# Patient Record
Sex: FEMALE | Race: WHITE | NOT HISPANIC OR LATINO | URBAN - METROPOLITAN AREA
[De-identification: names, ages, dates, MRNs, and addresses within clinical notes are randomized per-mention and may not be internally consistent; named-entity substitution may affect disease eponyms.]

---

## 2025-08-08 ENCOUNTER — HOSPITAL ENCOUNTER (INPATIENT)
Facility: HOSPITAL | Age: 60
End: 2025-08-08
Attending: EMERGENCY MEDICINE | Admitting: STUDENT IN AN ORGANIZED HEALTH CARE EDUCATION/TRAINING PROGRAM
Payer: COMMERCIAL

## 2025-08-08 ENCOUNTER — APPOINTMENT (OUTPATIENT)
Dept: RADIOLOGY | Facility: HOSPITAL | Age: 60
DRG: 638 | End: 2025-08-08
Payer: COMMERCIAL

## 2025-08-08 DIAGNOSIS — R73.9 HYPERGLYCEMIA: Primary | ICD-10-CM

## 2025-08-08 DIAGNOSIS — E11.10 DIABETIC KETOACIDOSIS WITHOUT COMA ASSOCIATED WITH TYPE 2 DIABETES MELLITUS: ICD-10-CM

## 2025-08-08 LAB
ALBUMIN SERPL BCP-MCNC: 3.9 G/DL (ref 3.4–5)
ALP SERPL-CCNC: 82 U/L (ref 33–110)
ALT SERPL W P-5'-P-CCNC: 13 U/L (ref 7–45)
AMPHETAMINES UR QL SCN: NORMAL
ANION GAP BLDV CALCULATED.4IONS-SCNC: 10 MMOL/L (ref 10–25)
ANION GAP SERPL CALC-SCNC: 18 MMOL/L (ref 10–20)
APPEARANCE UR: CLEAR
AST SERPL W P-5'-P-CCNC: 13 U/L (ref 9–39)
B-OH-BUTYR SERPL-SCNC: 1.7 MMOL/L (ref 0.02–0.27)
BARBITURATES UR QL SCN: NORMAL
BASE EXCESS BLDV CALC-SCNC: -2.5 MMOL/L (ref -2–3)
BASOPHILS # BLD AUTO: 0.03 X10*3/UL (ref 0–0.1)
BASOPHILS NFR BLD AUTO: 0.4 %
BENZODIAZ UR QL SCN: NORMAL
BILIRUB SERPL-MCNC: 0.5 MG/DL (ref 0–1.2)
BILIRUB UR STRIP.AUTO-MCNC: NEGATIVE MG/DL
BODY TEMPERATURE: ABNORMAL
BUN SERPL-MCNC: 15 MG/DL (ref 6–23)
BZE UR QL SCN: NORMAL
CA-I BLDV-SCNC: 1.19 MMOL/L (ref 1.1–1.33)
CALCIUM SERPL-MCNC: 9.1 MG/DL (ref 8.6–10.3)
CANNABINOIDS UR QL SCN: NORMAL
CHLORIDE BLDV-SCNC: 97 MMOL/L (ref 98–107)
CHLORIDE SERPL-SCNC: 94 MMOL/L (ref 98–107)
CO2 SERPL-SCNC: 19 MMOL/L (ref 21–32)
COLOR UR: COLORLESS
CREAT SERPL-MCNC: 0.97 MG/DL (ref 0.5–1.05)
CRITICAL CALL TIME: 1503
CRITICAL CALLED BY: ABNORMAL
CRITICAL CALLED TO: ABNORMAL
CRITICAL READ BACK: ABNORMAL
EGFRCR SERPLBLD CKD-EPI 2021: 68 ML/MIN/1.73M*2
EOSINOPHIL # BLD AUTO: 0.08 X10*3/UL (ref 0–0.7)
EOSINOPHIL NFR BLD AUTO: 0.9 %
ERYTHROCYTE [DISTWIDTH] IN BLOOD BY AUTOMATED COUNT: 12.2 % (ref 11.5–14.5)
FENTANYL+NORFENTANYL UR QL SCN: NORMAL
GLUCOSE BLD MANUAL STRIP-MCNC: 330 MG/DL (ref 74–99)
GLUCOSE BLD MANUAL STRIP-MCNC: 401 MG/DL (ref 74–99)
GLUCOSE BLD MANUAL STRIP-MCNC: 448 MG/DL (ref 74–99)
GLUCOSE BLD MANUAL STRIP-MCNC: >600 MG/DL (ref 74–99)
GLUCOSE BLD MANUAL STRIP-MCNC: >600 MG/DL (ref 74–99)
GLUCOSE BLDV-MCNC: >685 MG/DL (ref 74–99)
GLUCOSE SERPL-MCNC: 715 MG/DL (ref 74–99)
GLUCOSE UR STRIP.AUTO-MCNC: ABNORMAL MG/DL
HCO3 BLDV-SCNC: 21.7 MMOL/L (ref 22–26)
HCT VFR BLD AUTO: 39.6 % (ref 36–46)
HCT VFR BLD EST: 42 % (ref 36–46)
HGB BLD-MCNC: 13.8 G/DL (ref 12–16)
HGB BLDV-MCNC: 14 G/DL (ref 12–16)
IMM GRANULOCYTES # BLD AUTO: 0.03 X10*3/UL (ref 0–0.7)
IMM GRANULOCYTES NFR BLD AUTO: 0.4 % (ref 0–0.9)
INHALED O2 CONCENTRATION: 21 %
KETONES UR STRIP.AUTO-MCNC: ABNORMAL MG/DL
LACTATE BLDV-SCNC: 2.1 MMOL/L (ref 0.4–2)
LACTATE BLDV-SCNC: 2.4 MMOL/L (ref 0.4–2)
LACTATE SERPL-SCNC: 1 MMOL/L (ref 0.4–2)
LACTATE SERPL-SCNC: 2.1 MMOL/L (ref 0.4–2)
LACTATE SERPL-SCNC: 2.2 MMOL/L (ref 0.4–2)
LEUKOCYTE ESTERASE UR QL STRIP.AUTO: ABNORMAL
LYMPHOCYTES # BLD AUTO: 1.41 X10*3/UL (ref 1.2–4.8)
LYMPHOCYTES NFR BLD AUTO: 16.5 %
MCH RBC QN AUTO: 30.1 PG (ref 26–34)
MCHC RBC AUTO-ENTMCNC: 34.8 G/DL (ref 32–36)
MCV RBC AUTO: 87 FL (ref 80–100)
METHADONE UR QL SCN: NORMAL
MONOCYTES # BLD AUTO: 0.68 X10*3/UL (ref 0.1–1)
MONOCYTES NFR BLD AUTO: 8 %
MUCOUS THREADS #/AREA URNS AUTO: ABNORMAL /LPF
NEUTROPHILS # BLD AUTO: 6.3 X10*3/UL (ref 1.2–7.7)
NEUTROPHILS NFR BLD AUTO: 73.8 %
NITRITE UR QL STRIP.AUTO: NEGATIVE
NRBC BLD-RTO: 0 /100 WBCS (ref 0–0)
OPIATES UR QL SCN: NORMAL
OXYCODONE+OXYMORPHONE UR QL SCN: NORMAL
OXYHGB MFR BLDV: 77.3 % (ref 45–75)
PCO2 BLDV: 35 MM HG (ref 41–51)
PCP UR QL SCN: NORMAL
PH BLDV: 7.4 PH (ref 7.33–7.43)
PH UR STRIP.AUTO: 5 [PH]
PLATELET # BLD AUTO: 214 X10*3/UL (ref 150–450)
PO2 BLDV: 46 MM HG (ref 35–45)
POTASSIUM BLDV-SCNC: 4.1 MMOL/L (ref 3.5–5.3)
POTASSIUM SERPL-SCNC: 3.9 MMOL/L (ref 3.5–5.3)
PROT SERPL-MCNC: 7 G/DL (ref 6.4–8.2)
PROT UR STRIP.AUTO-MCNC: NEGATIVE MG/DL
RBC # BLD AUTO: 4.58 X10*6/UL (ref 4–5.2)
RBC # UR STRIP.AUTO: NEGATIVE MG/DL
RBC #/AREA URNS AUTO: ABNORMAL /HPF
SAO2 % BLDV: 83 % (ref 45–75)
SODIUM BLDV-SCNC: 125 MMOL/L (ref 136–145)
SODIUM SERPL-SCNC: 127 MMOL/L (ref 136–145)
SP GR UR STRIP.AUTO: <1.005
SQUAMOUS #/AREA URNS AUTO: ABNORMAL /HPF
UROBILINOGEN UR STRIP.AUTO-MCNC: ABNORMAL MG/DL
WBC # BLD AUTO: 8.5 X10*3/UL (ref 4.4–11.3)
WBC #/AREA URNS AUTO: ABNORMAL /HPF

## 2025-08-08 PROCEDURE — 81001 URINALYSIS AUTO W/SCOPE: CPT | Performed by: EMERGENCY MEDICINE

## 2025-08-08 PROCEDURE — 83605 ASSAY OF LACTIC ACID: CPT | Performed by: EMERGENCY MEDICINE

## 2025-08-08 PROCEDURE — 2500000004 HC RX 250 GENERAL PHARMACY W/ HCPCS (ALT 636 FOR OP/ED)

## 2025-08-08 PROCEDURE — 82947 ASSAY GLUCOSE BLOOD QUANT: CPT

## 2025-08-08 PROCEDURE — 82565 ASSAY OF CREATININE: CPT | Performed by: EMERGENCY MEDICINE

## 2025-08-08 PROCEDURE — 36415 COLL VENOUS BLD VENIPUNCTURE: CPT | Performed by: EMERGENCY MEDICINE

## 2025-08-08 PROCEDURE — 36415 COLL VENOUS BLD VENIPUNCTURE: CPT

## 2025-08-08 PROCEDURE — 83605 ASSAY OF LACTIC ACID: CPT

## 2025-08-08 PROCEDURE — 2500000002 HC RX 250 W HCPCS SELF ADMINISTERED DRUGS (ALT 637 FOR MEDICARE OP, ALT 636 FOR OP/ED): Performed by: EMERGENCY MEDICINE

## 2025-08-08 PROCEDURE — 85025 COMPLETE CBC W/AUTO DIFF WBC: CPT | Performed by: EMERGENCY MEDICINE

## 2025-08-08 PROCEDURE — 1100000001 HC PRIVATE ROOM DAILY

## 2025-08-08 PROCEDURE — 84132 ASSAY OF SERUM POTASSIUM: CPT

## 2025-08-08 PROCEDURE — 82010 KETONE BODYS QUAN: CPT

## 2025-08-08 PROCEDURE — 99285 EMERGENCY DEPT VISIT HI MDM: CPT | Performed by: EMERGENCY MEDICINE

## 2025-08-08 PROCEDURE — 83036 HEMOGLOBIN GLYCOSYLATED A1C: CPT | Mod: STJLAB

## 2025-08-08 PROCEDURE — 87077 CULTURE AEROBIC IDENTIFY: CPT | Mod: STJLAB | Performed by: EMERGENCY MEDICINE

## 2025-08-08 PROCEDURE — 96361 HYDRATE IV INFUSION ADD-ON: CPT

## 2025-08-08 PROCEDURE — 80307 DRUG TEST PRSMV CHEM ANLYZR: CPT

## 2025-08-08 PROCEDURE — 84075 ASSAY ALKALINE PHOSPHATASE: CPT | Performed by: EMERGENCY MEDICINE

## 2025-08-08 PROCEDURE — 72193 CT PELVIS W/DYE: CPT

## 2025-08-08 PROCEDURE — 2500000004 HC RX 250 GENERAL PHARMACY W/ HCPCS (ALT 636 FOR OP/ED): Performed by: EMERGENCY MEDICINE

## 2025-08-08 PROCEDURE — 2550000001 HC RX 255 CONTRASTS: Performed by: EMERGENCY MEDICINE

## 2025-08-08 PROCEDURE — 96365 THER/PROPH/DIAG IV INF INIT: CPT

## 2025-08-08 PROCEDURE — 72193 CT PELVIS W/DYE: CPT | Performed by: RADIOLOGY

## 2025-08-08 PROCEDURE — 2500000002 HC RX 250 W HCPCS SELF ADMINISTERED DRUGS (ALT 637 FOR MEDICARE OP, ALT 636 FOR OP/ED)

## 2025-08-08 PROCEDURE — 87154 CUL TYP ID BLD PTHGN 6+ TRGT: CPT | Mod: STJLAB | Performed by: EMERGENCY MEDICINE

## 2025-08-08 RX ORDER — INSULIN LISPRO 100 [IU]/ML
0-20 INJECTION, SOLUTION INTRAVENOUS; SUBCUTANEOUS
Status: DISCONTINUED | OUTPATIENT
Start: 2025-08-09 | End: 2025-08-08

## 2025-08-08 RX ORDER — INSULIN LISPRO 100 [IU]/ML
0-20 INJECTION, SOLUTION INTRAVENOUS; SUBCUTANEOUS
Status: DISCONTINUED | OUTPATIENT
Start: 2025-08-08 | End: 2025-08-10

## 2025-08-08 RX ORDER — SODIUM CHLORIDE 9 MG/ML
100 INJECTION, SOLUTION INTRAVENOUS CONTINUOUS
Status: ACTIVE | OUTPATIENT
Start: 2025-08-09 | End: 2025-08-10

## 2025-08-08 RX ORDER — INSULIN LISPRO 100 [IU]/ML
0.1 INJECTION, SOLUTION INTRAVENOUS; SUBCUTANEOUS ONCE
Status: COMPLETED | OUTPATIENT
Start: 2025-08-08 | End: 2025-08-08

## 2025-08-08 RX ORDER — INSULIN GLARGINE 100 [IU]/ML
15 INJECTION, SOLUTION SUBCUTANEOUS NIGHTLY
Status: DISCONTINUED | OUTPATIENT
Start: 2025-08-08 | End: 2025-08-09

## 2025-08-08 RX ORDER — SODIUM CHLORIDE, SODIUM LACTATE, POTASSIUM CHLORIDE, CALCIUM CHLORIDE 600; 310; 30; 20 MG/100ML; MG/100ML; MG/100ML; MG/100ML
125 INJECTION, SOLUTION INTRAVENOUS CONTINUOUS
Status: DISCONTINUED | OUTPATIENT
Start: 2025-08-08 | End: 2025-08-08

## 2025-08-08 RX ORDER — DEXTROSE 50 % IN WATER (D50W) INTRAVENOUS SYRINGE
12.5
Status: ACTIVE | OUTPATIENT
Start: 2025-08-08

## 2025-08-08 RX ORDER — DEXTROSE 50 % IN WATER (D50W) INTRAVENOUS SYRINGE
25
Status: ACTIVE | OUTPATIENT
Start: 2025-08-08

## 2025-08-08 RX ADMIN — SODIUM CHLORIDE, SODIUM LACTATE, POTASSIUM CHLORIDE, AND CALCIUM CHLORIDE 1000 ML: .6; .31; .03; .02 INJECTION, SOLUTION INTRAVENOUS at 16:41

## 2025-08-08 RX ADMIN — PIPERACILLIN SODIUM AND TAZOBACTAM SODIUM 3.38 G: 3; .375 INJECTION, SOLUTION INTRAVENOUS at 23:14

## 2025-08-08 RX ADMIN — INSULIN LISPRO 7 UNITS: 100 INJECTION, SOLUTION INTRAVENOUS; SUBCUTANEOUS at 15:25

## 2025-08-08 RX ADMIN — INSULIN GLARGINE 15 UNITS: 100 INJECTION, SOLUTION SUBCUTANEOUS at 20:52

## 2025-08-08 RX ADMIN — SODIUM CHLORIDE 1000 ML: 0.9 INJECTION, SOLUTION INTRAVENOUS at 23:14

## 2025-08-08 RX ADMIN — PIPERACILLIN SODIUM AND TAZOBACTAM SODIUM 4.5 G: 4; .5 INJECTION, SOLUTION INTRAVENOUS at 15:26

## 2025-08-08 RX ADMIN — IOHEXOL 75 ML: 350 INJECTION, SOLUTION INTRAVENOUS at 16:48

## 2025-08-08 RX ADMIN — SODIUM CHLORIDE, SODIUM LACTATE, POTASSIUM CHLORIDE, AND CALCIUM CHLORIDE 125 ML/HR: .6; .31; .03; .02 INJECTION, SOLUTION INTRAVENOUS at 20:53

## 2025-08-08 RX ADMIN — INSULIN LISPRO 20 UNITS: 100 INJECTION, SOLUTION INTRAVENOUS; SUBCUTANEOUS at 22:21

## 2025-08-08 ASSESSMENT — LIFESTYLE VARIABLES
EVER FELT BAD OR GUILTY ABOUT YOUR DRINKING: NO
HAVE PEOPLE ANNOYED YOU BY CRITICIZING YOUR DRINKING: NO
HAVE YOU EVER FELT YOU SHOULD CUT DOWN ON YOUR DRINKING: NO
EVER HAD A DRINK FIRST THING IN THE MORNING TO STEADY YOUR NERVES TO GET RID OF A HANGOVER: NO
TOTAL SCORE: 0

## 2025-08-08 ASSESSMENT — ENCOUNTER SYMPTOMS
CHILLS: 0
ACTIVITY CHANGE: 0
FEVER: 0

## 2025-08-08 ASSESSMENT — PAIN SCALES - GENERAL
PAINLEVEL_OUTOF10: 0 - NO PAIN
PAINLEVEL_OUTOF10: 3

## 2025-08-08 ASSESSMENT — PAIN - FUNCTIONAL ASSESSMENT
PAIN_FUNCTIONAL_ASSESSMENT: 0-10
PAIN_FUNCTIONAL_ASSESSMENT: 0-10

## 2025-08-08 ASSESSMENT — PAIN DESCRIPTION - LOCATION: LOCATION: BUTTOCKS
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
## 2025-08-08 NOTE — PROGRESS NOTES
Emergency Medicine Transition of Care Note.    I received Nia Helms in signout from Dr. Rivera.  Please see the previous ED provider note for all HPI, PE and MDM up to the time of signout at 1700. This is in addition to the primary record.    In brief Nia Helms is an 60 y.o. female presenting for   Chief Complaint   Patient presents with   • Abscess   • Hyperglycemia     At the time of signout we were awaiting: CT of the abdomen/pelvis to rule out necrotizing fasciitis or subcutaneous gas.    MDM:  Please see prior provider note for medical decision making up to the time of signout, physical exam and complete history of present illness.  ED Course as of 08/08/25 1812   Fri Aug 08, 2025   1627 Calculated anion gap 14 [JJ]   1735 Personally reviewed CT scan I do not appreciate any evidence of subcutaneous emphysema. [JJ]   1810 There is inflammatory stranding along the medial gluteal fold  bilaterally. There is a 0.9 x 1.8 cm low attenuating focus along the  right medial gluteal fold just inferior to the anorectum. There is a  subtle 1.0 x 1.7 cm hypodense focus along the left labia. These  findings are concerning for cellulitis and phlegmonous change/early  developing abscess. No locules of free air are noted in the soft  tissues of the groin.      Mildly enlarged bilateral inguinal lymph nodes are likely reactive.       [PV]   1810 Patient already received empiric antibiotic treatment, plan is admission for elevated blood glucose and abscesses.  Patient not on insulin drip, patient is amenable to admission to this facility.  Call out for admission placed at this time. [PV]      ED Course User Index  [JJ] Emiliano Morris DO  [PV] Jens Russo DO   Disposition: Admission for hyperglycemia without diabetic ketoacidosis, continue treatment for primary anal and labial abscesses  Reviewed and discussed with attending physician  Jens Russo DO

## 2025-08-08 NOTE — H&P
Internal Medicine    Admission H&P      Patient Nia Helms PCP No Assigned PCP Generic Provider, MD AGUIRRE [phone]  Admission Date 8/8/2025      Chief Complaint/Reason for Admission:  Nia is a 60 y.o. female who presented today with hyperglycemia, left labial abscess, and right perirectal abscess.     Subjective    Subjective   History of Presenting Illness  Ms. Nia Helms is a 60 y.o. female with a primary complaint of hyperglycemia in addition to a left labial and right perirectal abscess. PMHx of uncontrolled type 2 diabetes mellitus (A1c 16.4% April 2025) c/b peripheral neuropathy of the hands, recurrent vaginal yeast infections, and recurrent labial and perirectal abscesses.  Patient previously admitted 04/2025 at [facility] for similar concerns over hyperglycemia and the left labial and right perirectal abscess.     Patient presented to the ED today over concerns of nausea and a fever overnight. She states these symptoms were similar to when she visited the ED in 04/2025. She states she would have come in last night over concerns for her symptoms, but did not due to needing to find care for her dog. Additionally, she has noticed both her left labial and right perirectal abscess have been growing recently. The right perirectal abscess is more painful and bothersome than the left labial abscess. She has noticed minimal drainage out of the labial abscess, but has not made note of drainage from the perirectal abscess. She is usually afebrile but did make note of new onset subjective fever overnight. Due to overall concerns of similar symptoms to her last ED visit for hyperglycemia, she came to the ED today.     Patient states she has been off of all of her diabetes medication for the past few months-year. She was most recently being treated with insulin, but stopped about a year ago due to her feeling it wasn't helping. Since, she has been untreated for her T2DM. Her morning glucose levels  "average 300 or less. Her nighttime glucose levels average 300-600. Over a 4 month span after stopping insulin therapy, she lost 75 pounds unintentionally and \"feels much better\". She has noted that since stopping insulin, she urinates \"40 cups a day\" consistently. She also mentions that she has been battling frequent vaginal yeast infections.     In the ED, her glucose was initially 715, resulting in 7 units of insulin lispro and 1 L lactated ringers being administered. Later, POCT glucose of 330 and 448 were obtained after all 7 units of insulin were given. She showed no signs of DKA due to no acidosis and no anion gap. UA showed 1000 (4+) glucose, 10 (1+) ketones, 25 leukocyte esterase, and 11-20 WBC. A CT of the pelvis was completed to get further visualization of the labial and perirectal abscesses to rule out necrotizing fasciitis and abscess tunneling/connection. There is inflammatory stranding along the medial gluteal fold bilaterally. There is a 0.9 x 1.8 cm low attenuating focus along the right medial gluteal fold just inferior to the anorectum. There is a subtle 1.0 x 1.7 cm hypodense focus along the left labia. These findings are concerning for cellulitis and phlegmonous change/early developing abscess. No locules of free air are noted in the soft tissues of the groin. Zosyn was initiated for coverage of abscesses. Blood cultures and urine cultures were obtained and are pending. She denied any CP, SOB, fever, nausea, vomiting, and dysuria.     Code status was discussed with her in depth. She wishes to be DNR/DNI, fully understanding this means she will not receive chest compressions or intubation in the setting of cardiac or respiratory failure.     ED course:  V/S: /64, 108 HR, 18 RR, 36.9 temp  Labs: 127 Na, 715 glucose, 137 Corrected Na, 3.9 K, 94 Cl, 19 bicarbonate, 2.1 lactate, 1.70 BHB,   EKG: None  Imaging: CT (see below)  Intervention: 7 units insulin lispro, 1 L Lactated Ringers, Zosyn " "3.375    Past Medical History  DKA with diabetic coma and also DKA without coma most recent admission was Dec 2023  Admission for diabetic coma in 2020 with acute renal failure requiring intermittent hemodialysis  Groin abscesses, complicated by necrotizing infection in 2021  Gastroparesis  Nicotine use disorder    Past Surgical History   Multiple incision and drainages of thigh/buttock abscesses, last done in 2021  Appendectomy 2020  Uterine ablation 2017    Social History  Alcohol, very rarely, maybe every 6 months  Current smoker, 1 pack/day  Marijuana use daily with edibles, denies other drugs    Home Medication:  No home medications, no fills in the last year except for antibiotic she was discharged on in April 2025    Family History  Diabetes    Allergies:  RX Allergies[1]     Review of System:  Review of Systems   Constitutional:  Positive for appetite change (Did not eat today prior to a sandwich in the ED). Negative for activity change, chills and fever.   HENT: Negative.     Eyes: Negative.    Respiratory: Negative.  Negative for cough, chest tightness, shortness of breath and wheezing.    Cardiovascular: Negative.  Negative for chest pain and leg swelling.   Gastrointestinal:  Positive for nausea (Nauseas last night but not today). Negative for abdominal distention, abdominal pain, blood in stool, constipation, diarrhea and vomiting.   Endocrine: Positive for polyuria (Patient endorses urinating \"40 cups a day\").   Genitourinary:  Positive for pelvic pain (2/2 to left labial and right perirectal abscess) and vaginal discharge (chronic vaginal yeast infections). Negative for difficulty urinating, dysuria, flank pain, frequency, hematuria, menstrual problem and vaginal bleeding.   Musculoskeletal: Negative.    Skin:  Positive for wound (Left labial abscess (started to drain) and right perirectal abscess. Patient states both have recently grown in size.).   Neurological:  Positive for numbness (bilateral " "hand numbness for past few months) and headaches (started yesterday). Negative for dizziness, syncope and weakness.   Psychiatric/Behavioral: Negative.  Negative for confusion. The patient is not nervous/anxious.        Objective    Objective   Vital Signs:  Visit Vitals  /77 (BP Location: Right arm, Patient Position: Sitting)   Pulse 94   Temp 36.6 °C (97.9 °F) (Temporal)   Resp 20   Ht 1.676 m (5' 6\")   Wt 68.5 kg (151 lb)   SpO2 98%   BMI 24.37 kg/m²   Smoking Status Every Day   BSA 1.79 m²        Physical Examination:  Physical Exam  Constitutional:       General: She is not in acute distress.     Appearance: Normal appearance. She is normal weight. She is not ill-appearing, toxic-appearing or diaphoretic.   HENT:      Head: Normocephalic and atraumatic.      Nose: Nose normal.      Mouth/Throat:      Mouth: Mucous membranes are dry.      Pharynx: Oropharynx is clear.     Eyes:      Extraocular Movements: Extraocular movements intact.      Conjunctiva/sclera: Conjunctivae normal.      Pupils: Pupils are equal, round, and reactive to light.       Cardiovascular:      Rate and Rhythm: Normal rate and regular rhythm.      Pulses: Normal pulses.      Heart sounds: Normal heart sounds. No murmur heard.  Pulmonary:      Effort: Pulmonary effort is normal. No respiratory distress.      Breath sounds: Normal breath sounds. No wheezing.   Chest:      Chest wall: No tenderness.   Abdominal:      General: Abdomen is flat. There is no distension.      Palpations: Abdomen is soft. There is no mass.      Tenderness: There is no abdominal tenderness. There is no guarding or rebound.     Musculoskeletal:         General: No tenderness.      Right lower leg: No edema.      Left lower leg: No edema.     Skin:     General: Skin is warm and dry.      Findings: Lesion (left labial abscess, indurated and erythematous, drainage hole with scant bloody drainage. Right perirectal abscesses, indurated and erythematous, no drainage, " mildly tender to palpation) present.     Neurological:      Mental Status: She is alert and oriented to person, place, and time. Mental status is at baseline.      Sensory: Sensory deficit (bilateral hand numbness) present.      Motor: No weakness.     Psychiatric:         Mood and Affect: Mood normal.         Behavior: Behavior normal.           Laboratory Data:  Results for orders placed or performed during the hospital encounter of 08/08/25 (from the past 24 hours)   POCT GLUCOSE   Result Value Ref Range    POCT Glucose >600 (H) 74 - 99 mg/dL   CBC and Auto Differential   Result Value Ref Range    WBC 8.5 4.4 - 11.3 x10*3/uL    nRBC 0.0 0.0 - 0.0 /100 WBCs    RBC 4.58 4.00 - 5.20 x10*6/uL    Hemoglobin 13.8 12.0 - 16.0 g/dL    Hematocrit 39.6 36.0 - 46.0 %    MCV 87 80 - 100 fL    MCH 30.1 26.0 - 34.0 pg    MCHC 34.8 32.0 - 36.0 g/dL    RDW 12.2 11.5 - 14.5 %    Platelets 214 150 - 450 x10*3/uL    Neutrophils % 73.8 40.0 - 80.0 %    Immature Granulocytes %, Automated 0.4 0.0 - 0.9 %    Lymphocytes % 16.5 13.0 - 44.0 %    Monocytes % 8.0 2.0 - 10.0 %    Eosinophils % 0.9 0.0 - 6.0 %    Basophils % 0.4 0.0 - 2.0 %    Neutrophils Absolute 6.30 1.20 - 7.70 x10*3/uL    Immature Granulocytes Absolute, Automated 0.03 0.00 - 0.70 x10*3/uL    Lymphocytes Absolute 1.41 1.20 - 4.80 x10*3/uL    Monocytes Absolute 0.68 0.10 - 1.00 x10*3/uL    Eosinophils Absolute 0.08 0.00 - 0.70 x10*3/uL    Basophils Absolute 0.03 0.00 - 0.10 x10*3/uL   Comprehensive metabolic panel   Result Value Ref Range    Glucose 715 (HH) 74 - 99 mg/dL    Sodium 127 (L) 136 - 145 mmol/L    Potassium 3.9 3.5 - 5.3 mmol/L    Chloride 94 (L) 98 - 107 mmol/L    Bicarbonate 19 (L) 21 - 32 mmol/L    Anion Gap 18 10 - 20 mmol/L    Urea Nitrogen 15 6 - 23 mg/dL    Creatinine 0.97 0.50 - 1.05 mg/dL    eGFR 68 >60 mL/min/1.73m*2    Calcium 9.1 8.6 - 10.3 mg/dL    Albumin 3.9 3.4 - 5.0 g/dL    Alkaline Phosphatase 82 33 - 110 U/L    Total Protein 7.0 6.4 - 8.2  g/dL    AST 13 9 - 39 U/L    Bilirubin, Total 0.5 0.0 - 1.2 mg/dL    ALT 13 7 - 45 U/L   Lactate   Result Value Ref Range    Lactate 2.1 (H) 0.4 - 2.0 mmol/L   Blood Culture    Specimen: Peripheral Venipuncture; Blood culture   Result Value Ref Range    Blood Culture Loaded on Instrument - Culture in progress    Blood Culture    Specimen: Peripheral Venipuncture; Blood culture   Result Value Ref Range    Blood Culture Loaded on Instrument - Culture in progress    Blood Gas Venous Full Panel   Result Value Ref Range    POCT pH, Venous 7.40 7.33 - 7.43 pH    POCT pCO2, Venous 35 (L) 41 - 51 mm Hg    POCT pO2, Venous 46 (H) 35 - 45 mm Hg    POCT SO2, Venous 83 (H) 45 - 75 %    POCT Oxy Hemoglobin, Venous 77.3 (H) 45.0 - 75.0 %    POCT Hematocrit Calculated, Venous 42.0 36.0 - 46.0 %    POCT Sodium, Venous 125 (L) 136 - 145 mmol/L    POCT Potassium, Venous 4.1 3.5 - 5.3 mmol/L    POCT Chloride, Venous 97 (L) 98 - 107 mmol/L    POCT Ionized Calicum, Venous 1.19 1.10 - 1.33 mmol/L    POCT Glucose, Venous >685 (HH) 74 - 99 mg/dL    POCT Lactate, Venous 2.4 (H) 0.4 - 2.0 mmol/L    POCT Base Excess, Venous -2.5 (L) -2.0 - 3.0 mmol/L    POCT HCO3 Calculated, Venous 21.7 (L) 22.0 - 26.0 mmol/L    POCT Hemoglobin, Venous 14.0 12.0 - 16.0 g/dL    POCT Anion Gap, Venous 10.0 10.0 - 25.0 mmol/L    Patient Temperature      FiO2 21 %    Critical Called By VANDANA     Critical Called To DR RUIZ     Critical Call Time 1503     Critical Read Back Y    Beta Hydroxybutyrate   Result Value Ref Range    Beta-Hydroxybutyrate 1.70 (H) 0.02 - 0.27 mmol/L   POCT GLUCOSE   Result Value Ref Range    POCT Glucose >600 (H) 74 - 99 mg/dL   Urinalysis with Reflex Culture and Microscopic   Result Value Ref Range    Color, Urine Colorless (N) Straw, Yellow    Appearance, Urine Clear Clear    Specific Gravity, Urine <1.005 (A) 1.005 - 1.035    pH, Urine 5.0 5.0, 5.5, 6.0, 6.5, 7.0, 7.5, 8.0    Protein, Urine NEGATIVE NEGATIVE, 10 (TRACE) mg/dL    Glucose,  Urine 1000 (4+) (A) NEGATIVE mg/dL    Blood, Urine NEGATIVE NEGATIVE mg/dL    Ketones, Urine 10 (1+) (A) NEGATIVE mg/dL    Bilirubin, Urine NEGATIVE NEGATIVE mg/dL    Urobilinogen, Urine NORM NORM mg/dL    Nitrite, Urine NEGATIVE NEGATIVE    Leukocyte Esterase, Urine 25 Deng/uL (A) NEGATIVE   Microscopic Only, Urine   Result Value Ref Range    WBC, Urine 11-20 (A) 1-5, NONE /HPF    RBC, Urine 3-5 NONE, 1-2, 3-5 /HPF    Squamous Epithelial Cells, Urine 1-9 (SPARSE) Reference range not established. /HPF    Mucus, Urine FEW Reference range not established. /LPF   Blood Gas Lactic Acid, Venous   Result Value Ref Range    POCT Lactate, Venous 2.1 (H) 0.4 - 2.0 mmol/L   Lactate   Result Value Ref Range    Lactate 2.2 (H) 0.4 - 2.0 mmol/L   POCT GLUCOSE   Result Value Ref Range    POCT Glucose 330 (H) 74 - 99 mg/dL       Imaging:  Imaging  CT pelvis w IV contrast  Result Date: 8/8/2025  There is inflammatory stranding along the medial gluteal fold bilaterally. There is a 0.9 x 1.8 cm low attenuating focus along the right medial gluteal fold just inferior to the anorectum. There is a subtle 1.0 x 1.7 cm hypodense focus along the left labia. These findings are concerning for cellulitis and phlegmonous change/early developing abscess. No locules of free air are noted in the soft tissues of the groin.   Mildly enlarged bilateral inguinal lymph nodes are likely reactive.   Additional findings as noted above.   MACRO: None   Signed by: Jose Fang 8/8/2025 5:43 PM Dictation workstation:   [id number]      Cardiology, Vascular, and Other Imaging  No other imaging results found for the past 2 days       Medications:  Scheduled medications  Scheduled Medications[2]  Continuous medications  Continuous Medications[3]  PRN medications  PRN Medications[4]    Assessment    Assessment & Plan   Ms. Nia Helms is a 60 y.o. female who presents with hyperglycemia, a febrile event on 8/7, and a left labial and right perirectal abscess.  "PMHx of uncontrolled type 2 diabetes mellitus c/b peripheral neuropathy of the hands, recurrent vaginal yeast infections, and recurrent labial and perirectal abscesses. She had a recent visit in 04/2025 at [facility] ED for similar concerns over hyperglycemia and the left labial and right perirectal abscess. She is admitted for hyperglycemia and DKA rule out, in addition to care for her labial and perirectal abscesses.     #Hyperglycemia  #Uncontrolled, untreated Type II Diabetes Mellitus  #Polyuria   - Patient has uncontrolled and untreated type II diabetes mellitus. She stopped taking her insulin therapy one year ago. Since then she has lost 75 pound over a 4 month period, in addition to noticing she urinates \"40 cups\" a day consistently.   - She runs normally at a glucose of 300-600 at home.  - She is Aox3 and having pleasant conversation in spite of the elevated glucose levels.   - Insulin glargine 15 units nightly  - Insulin Lispro sliding scale #4 with meals and at night  - Normal Saline 0.9% 24 hour infusion at 150 mL/hr  - Hypoglycemia measures in place  - UA was positive for glucose, ketones, leukocyte esterase, and WBCs  - Urine culture pending  - Beta hydroxybutyrate 1.7  - Anion Gap 18  - Lactate 2.2 --> 2.1 --> 1.0  - Initial glucose 715   - given 7 units insulin lispro and 1 L lactated Ringers in ED   - POCT Glucose (after 7 units insulin): 330-->448-->401  - Na 127   - Corrected Na 137  - Carb control diet  - Monitor I's and O's  - BMP Repeat after fluids   - Pending  - Trend POCT Glucose    #Labial Abscess (left)  #Perirectal Abscess (right)  - Patient has noticed that her two abscesses have grown in size recently. The perirectal abscess is more painful and uncomfortable to her. The labial abscess has periodic drainage.   - Subjective fever at home on 8/7. Now afebrile  - Perirectal Abscess: There is a 0.9 x 1.8 cm low attenuating focus along the right medial gluteal fold just inferior to the " anorectum.   - Labial Abscess: There is a subtle 1.0 x 1.7 cm hypodense focus along the left labia.   - Draining minimally  - CT-Pelvis obtained:  - There is inflammatory stranding along the medial gluteal fold bilaterally. These findings are concerning for cellulitis and phlegmonous change/early developing abscess. No locules of free air are noted in the soft tissues of the groin.   - Zosyn 3.375 for abscess coverage  - Blood cultures pending  - Consult Acute Care Surgery:   - Pending recommendations      CHRONIC PROBLEMS:  #Vaginal Yeast Infections  - Treat as needed if patient is symptomatic. Patient states that diflucan does not usually work at the starting dose for her.     Global Plan of Care  IVF:  0.9%  mL/hr  Diet: Adult Carb Control 60 gm/meal  DVT prophylaxis: None  Dispo: 1-2 days  Consults: Acute care surgery  Code Status: DNR and No Intubation       To be staffed with attending.  Signature: Gabino Peres,   Date: August 8, 2025  This note has been transcribed using Dragon voice recognition system and there is a possibility of unintentional typing misprints.  Any information found to be copied from previous providers is done in the best interest of the patient to provide accurate, quality, and continuity of care.     SENIOR ADDENDUM    Patient seen and examined separate from resident physician. Agree with findings above and note reflects my direct edits.    Nia Helms is a 60-year-old female with history of severely uncontrolled type II diabetes (last A1c 16.4%) complicated by peripheral neuropathy and multiple recurrent labial/perineal abscesses presenting for worsening abscess and hyperglycemia from home.  On presentation she is vitally stable, afebrile, mentating well.  Lab work reveals hyperglycemia of 715, no leukocytosis, corrected sodium is 137, no acidosis pH 7.4.  No concern for DKA at this time, no acidosis on VBG or anion gap.  Suspect the patient has been living in this glucose  range for quite some time and her body has compensated.    She does have abscesses appreciable in the left labial fold and the right perineum, CT pelvis not concerning for any necrotizing infection but showing early developing abscess.  She was started on Zosyn and given 7 units lispro as well as 1 L bolus in the ED.  Blood cultures and urine cultures sent and pending.    Will admit the patient continue her on Zosyn, consult acute care surgery for the abscesses though I do not expect that these will be drainable, perhaps a bedside I&D if indicated.  Would consider transitioning to oral for cellulitis with Augmentin on discharge, etiology of recurrent abscesses could be secondary to excessive urination and glucosuria.  Lactate initially elevated but downtrending nicely with fluids.    Given the patient has stated she has lost weight significantly in the last year without really trying except for going off of insulin, will add HIV and hep C to rule out this as the cause of weight loss.  Add TSH as this has not been checked in several years.    For hyperglycemia and severely uncontrolled diabetes we will start the patient on Lantus, 15 units to be started now and put her on sliding scale #4.  Patient will need referral to endocrinology and primary care doctor prior to leaving the hospital to establish care.  Diabetic regimen will be difficult to get the patient to buy into, could consider GLP-1 however she has history of gastroparesis and also states she only has bowel movements 1-2 times per week so I am not sure that this will be the best option for her.  Repeat A1c, follow-up results.  Gave additional 1 L bolus normal saline for total of 2 L and started the patient on maintenance fluids with normal saline.  Repeat BMP early morning with potassium 3.4, will replete.  Glucose 265 which is improving.    Assessment and plan to be discussed with attending physician.    Brianna Alexander, DO   Family Medicine -  PGY-2         [1] No Known Allergies  [2] insulin glargine, 15 Units, subcutaneous, Nightly  [START ON 8/9/2025] insulin lispro, 0-20 Units, subcutaneous, TID AC     [3]    [4] PRN medications: dextrose, dextrose, glucagon, glucagon

## 2025-08-09 ENCOUNTER — APPOINTMENT (OUTPATIENT)
Dept: CARDIOLOGY | Facility: HOSPITAL | Age: 60
DRG: 638 | End: 2025-08-09
Payer: COMMERCIAL

## 2025-08-09 PROBLEM — E11.65 POORLY CONTROLLED TYPE 2 DIABETES MELLITUS WITH NEUROPATHY: Status: ACTIVE | Noted: 2023-12-23

## 2025-08-09 PROBLEM — B95.62 MRSA BACTEREMIA: Status: RESOLVED | Noted: 2017-04-06 | Resolved: 2025-08-09

## 2025-08-09 PROBLEM — E11.10 DIABETIC KETOACIDOSIS WITHOUT COMA ASSOCIATED WITH TYPE 2 DIABETES MELLITUS: Status: ACTIVE | Noted: 2025-08-08

## 2025-08-09 PROBLEM — L08.9 SOFT TISSUE INFECTION: Status: RESOLVED | Noted: 2021-12-19 | Resolved: 2025-08-09

## 2025-08-09 PROBLEM — R87.613 HGSIL ON CYTOLOGIC SMEAR OF CERVIX: Status: RESOLVED | Noted: 2017-08-02 | Resolved: 2025-08-09

## 2025-08-09 PROBLEM — K31.84 GASTROPARESIS: Status: ACTIVE | Noted: 2022-03-07

## 2025-08-09 PROBLEM — Z90.49 S/P LAPAROSCOPIC APPENDECTOMY: Status: ACTIVE | Noted: 2020-10-20

## 2025-08-09 PROBLEM — R12 HEART BURN: Status: RESOLVED | Noted: 2022-03-07 | Resolved: 2025-08-09

## 2025-08-09 PROBLEM — E78.5 HYPERLIPIDEMIA: Status: RESOLVED | Noted: 2025-04-10 | Resolved: 2025-08-09

## 2025-08-09 PROBLEM — F17.200 NICOTINE USE DISORDER: Status: RESOLVED | Noted: 2025-04-12 | Resolved: 2025-08-09

## 2025-08-09 PROBLEM — S06.6X9A SUBARACHNOID HEMORRHAGE FOLLOWING INJURY, WITH LOSS OF CONSCIOUSNESS (MULTI): Status: RESOLVED | Noted: 2025-08-09 | Resolved: 2025-08-09

## 2025-08-09 PROBLEM — N92.1 MENORRHAGIA WITH IRREGULAR CYCLE: Status: RESOLVED | Noted: 2017-08-02 | Resolved: 2025-08-09

## 2025-08-09 PROBLEM — R78.81 MRSA BACTEREMIA: Status: RESOLVED | Noted: 2017-04-06 | Resolved: 2025-08-09

## 2025-08-09 PROBLEM — N76.4 LEFT GENITAL LABIAL ABSCESS: Status: ACTIVE | Noted: 2025-04-10

## 2025-08-09 PROBLEM — E11.40 POORLY CONTROLLED TYPE 2 DIABETES MELLITUS WITH NEUROPATHY: Status: ACTIVE | Noted: 2023-12-23

## 2025-08-09 PROBLEM — D35.01 ADRENAL ADENOMA, RIGHT: Status: RESOLVED | Noted: 2025-04-11 | Resolved: 2025-08-09

## 2025-08-09 LAB
ALBUMIN SERPL BCP-MCNC: 3.2 G/DL (ref 3.4–5)
ANION GAP SERPL CALC-SCNC: 12 MMOL/L (ref 10–20)
ANION GAP SERPL CALC-SCNC: 13 MMOL/L (ref 10–20)
BUN SERPL-MCNC: 10 MG/DL (ref 6–23)
BUN SERPL-MCNC: 11 MG/DL (ref 6–23)
CALCIUM SERPL-MCNC: 8.3 MG/DL (ref 8.6–10.3)
CALCIUM SERPL-MCNC: 8.5 MG/DL (ref 8.6–10.3)
CHLORIDE SERPL-SCNC: 105 MMOL/L (ref 98–107)
CHLORIDE SERPL-SCNC: 107 MMOL/L (ref 98–107)
CO2 SERPL-SCNC: 21 MMOL/L (ref 21–32)
CO2 SERPL-SCNC: 22 MMOL/L (ref 21–32)
CREAT SERPL-MCNC: 0.59 MG/DL (ref 0.5–1.05)
CREAT SERPL-MCNC: 0.66 MG/DL (ref 0.5–1.05)
EGFRCR SERPLBLD CKD-EPI 2021: >90 ML/MIN/1.73M*2
EGFRCR SERPLBLD CKD-EPI 2021: >90 ML/MIN/1.73M*2
ERYTHROCYTE [DISTWIDTH] IN BLOOD BY AUTOMATED COUNT: 12 % (ref 11.5–14.5)
EST. AVERAGE GLUCOSE BLD GHB EST-MCNC: 433 MG/DL
GLUCOSE BLD MANUAL STRIP-MCNC: 210 MG/DL (ref 74–99)
GLUCOSE BLD MANUAL STRIP-MCNC: 267 MG/DL (ref 74–99)
GLUCOSE BLD MANUAL STRIP-MCNC: 269 MG/DL (ref 74–99)
GLUCOSE BLD MANUAL STRIP-MCNC: 282 MG/DL (ref 74–99)
GLUCOSE BLD MANUAL STRIP-MCNC: 290 MG/DL (ref 74–99)
GLUCOSE BLD MANUAL STRIP-MCNC: 309 MG/DL (ref 74–99)
GLUCOSE SERPL-MCNC: 234 MG/DL (ref 74–99)
GLUCOSE SERPL-MCNC: 265 MG/DL (ref 74–99)
HBA1C MFR BLD: 16.7 % (ref ?–5.7)
HCT VFR BLD AUTO: 35.6 % (ref 36–46)
HCV AB SER QL: NONREACTIVE
HGB BLD-MCNC: 12.3 G/DL (ref 12–16)
HIV 1+2 AB+HIV1 P24 AG SERPL QL IA: NONREACTIVE
MAGNESIUM SERPL-MCNC: 1.82 MG/DL (ref 1.6–2.4)
MCH RBC QN AUTO: 30.1 PG (ref 26–34)
MCHC RBC AUTO-ENTMCNC: 34.6 G/DL (ref 32–36)
MCV RBC AUTO: 87 FL (ref 80–100)
MECA ISLT/SPM QL: NOT DETECTED
METHICILLIN RESISTANCE MECC GENE [PRESENCE] BY MOLECULAR METHOD: NOT DETECTED
NRBC BLD-RTO: 0 /100 WBCS (ref 0–0)
PHOSPHATE SERPL-MCNC: 2.8 MG/DL (ref 2.5–4.9)
PLATELET # BLD AUTO: 168 X10*3/UL (ref 150–450)
POTASSIUM SERPL-SCNC: 3.4 MMOL/L (ref 3.5–5.3)
POTASSIUM SERPL-SCNC: 3.7 MMOL/L (ref 3.5–5.3)
RBC # BLD AUTO: 4.09 X10*6/UL (ref 4–5.2)
SODIUM SERPL-SCNC: 136 MMOL/L (ref 136–145)
SODIUM SERPL-SCNC: 137 MMOL/L (ref 136–145)
STAPHYLOCOCCUS DNA BLD POS QL NAA+PROBE: DETECTED
TSH SERPL-ACNC: 1.19 MIU/L (ref 0.44–3.98)
WBC # BLD AUTO: 7.7 X10*3/UL (ref 4.4–11.3)

## 2025-08-09 PROCEDURE — 2500000004 HC RX 250 GENERAL PHARMACY W/ HCPCS (ALT 636 FOR OP/ED)

## 2025-08-09 PROCEDURE — 83735 ASSAY OF MAGNESIUM: CPT

## 2025-08-09 PROCEDURE — 80069 RENAL FUNCTION PANEL: CPT

## 2025-08-09 PROCEDURE — 87389 HIV-1 AG W/HIV-1&-2 AB AG IA: CPT | Mod: STJLAB

## 2025-08-09 PROCEDURE — 36415 COLL VENOUS BLD VENIPUNCTURE: CPT

## 2025-08-09 PROCEDURE — 84443 ASSAY THYROID STIM HORMONE: CPT

## 2025-08-09 PROCEDURE — 80048 BASIC METABOLIC PNL TOTAL CA: CPT | Mod: CCI

## 2025-08-09 PROCEDURE — 85027 COMPLETE CBC AUTOMATED: CPT

## 2025-08-09 PROCEDURE — 82607 VITAMIN B-12: CPT | Mod: STJLAB

## 2025-08-09 PROCEDURE — 1100000001 HC PRIVATE ROOM DAILY

## 2025-08-09 PROCEDURE — 2500000001 HC RX 250 WO HCPCS SELF ADMINISTERED DRUGS (ALT 637 FOR MEDICARE OP)

## 2025-08-09 PROCEDURE — 2500000002 HC RX 250 W HCPCS SELF ADMINISTERED DRUGS (ALT 637 FOR MEDICARE OP, ALT 636 FOR OP/ED)

## 2025-08-09 PROCEDURE — 99223 1ST HOSP IP/OBS HIGH 75: CPT

## 2025-08-09 PROCEDURE — 2500000004 HC RX 250 GENERAL PHARMACY W/ HCPCS (ALT 636 FOR OP/ED): Performed by: STUDENT IN AN ORGANIZED HEALTH CARE EDUCATION/TRAINING PROGRAM

## 2025-08-09 PROCEDURE — 86803 HEPATITIS C AB TEST: CPT | Mod: STJLAB

## 2025-08-09 PROCEDURE — 99222 1ST HOSP IP/OBS MODERATE 55: CPT | Performed by: SURGERY

## 2025-08-09 PROCEDURE — 82947 ASSAY GLUCOSE BLOOD QUANT: CPT

## 2025-08-09 PROCEDURE — 93005 ELECTROCARDIOGRAM TRACING: CPT

## 2025-08-09 PROCEDURE — 99204 OFFICE O/P NEW MOD 45 MIN: CPT | Performed by: OBSTETRICS & GYNECOLOGY

## 2025-08-09 PROCEDURE — 82746 ASSAY OF FOLIC ACID SERUM: CPT | Mod: STJLAB

## 2025-08-09 RX ORDER — POTASSIUM CHLORIDE 20 MEQ/1
20 TABLET, EXTENDED RELEASE ORAL ONCE
Status: COMPLETED | OUTPATIENT
Start: 2025-08-09 | End: 2025-08-09

## 2025-08-09 RX ORDER — FLUCONAZOLE 150 MG/1
150 TABLET ORAL DAILY
Status: DISPENSED | OUTPATIENT
Start: 2025-08-09 | End: 2025-08-16

## 2025-08-09 RX ORDER — INSULIN LISPRO 100 [IU]/ML
5 INJECTION, SOLUTION INTRAVENOUS; SUBCUTANEOUS
Status: DISCONTINUED | OUTPATIENT
Start: 2025-08-09 | End: 2025-08-10

## 2025-08-09 RX ORDER — VANCOMYCIN HYDROCHLORIDE 1.25 G/250ML
1250 INJECTION, SOLUTION INTRAVITREAL EVERY 12 HOURS
Status: DISPENSED | OUTPATIENT
Start: 2025-08-09

## 2025-08-09 RX ORDER — ACETAMINOPHEN 325 MG/1
975 TABLET ORAL EVERY 8 HOURS PRN
Status: DISPENSED | OUTPATIENT
Start: 2025-08-09

## 2025-08-09 RX ORDER — INSULIN GLARGINE 100 [IU]/ML
20 INJECTION, SOLUTION SUBCUTANEOUS NIGHTLY
Status: DISCONTINUED | OUTPATIENT
Start: 2025-08-09 | End: 2025-08-10

## 2025-08-09 RX ORDER — FLUCONAZOLE 150 MG/1
150 TABLET ORAL DAILY
Status: DISCONTINUED | OUTPATIENT
Start: 2025-08-09 | End: 2025-08-09

## 2025-08-09 RX ORDER — VANCOMYCIN HYDROCHLORIDE 1 G/20ML
INJECTION, POWDER, LYOPHILIZED, FOR SOLUTION INTRAVENOUS DAILY PRN
Status: DISPENSED | OUTPATIENT
Start: 2025-08-09

## 2025-08-09 RX ORDER — METOCLOPRAMIDE HYDROCHLORIDE 5 MG/ML
10 INJECTION INTRAMUSCULAR; INTRAVENOUS EVERY 6 HOURS PRN
Status: DISPENSED | OUTPATIENT
Start: 2025-08-09

## 2025-08-09 RX ADMIN — PIPERACILLIN SODIUM AND TAZOBACTAM SODIUM 3.38 G: 3; .375 INJECTION, SOLUTION INTRAVENOUS at 16:28

## 2025-08-09 RX ADMIN — PIPERACILLIN SODIUM AND TAZOBACTAM SODIUM 3.38 G: 3; .375 INJECTION, SOLUTION INTRAVENOUS at 06:51

## 2025-08-09 RX ADMIN — FLUCONAZOLE 150 MG: 150 TABLET ORAL at 21:32

## 2025-08-09 RX ADMIN — INSULIN LISPRO 12 UNITS: 100 INJECTION, SOLUTION INTRAVENOUS; SUBCUTANEOUS at 08:38

## 2025-08-09 RX ADMIN — VANCOMYCIN HYDROCHLORIDE 1250 MG: 1.25 INJECTION, SOLUTION INTRAVITREAL at 23:58

## 2025-08-09 RX ADMIN — POTASSIUM CHLORIDE EXTENDED-RELEASE 20 MEQ: 1500 TABLET ORAL at 04:12

## 2025-08-09 RX ADMIN — INSULIN GLARGINE 20 UNITS: 100 INJECTION, SOLUTION SUBCUTANEOUS at 20:40

## 2025-08-09 RX ADMIN — ACETAMINOPHEN 975 MG: 325 TABLET ORAL at 21:32

## 2025-08-09 RX ADMIN — INSULIN LISPRO 16 UNITS: 100 INJECTION, SOLUTION INTRAVENOUS; SUBCUTANEOUS at 12:26

## 2025-08-09 RX ADMIN — INSULIN LISPRO 5 UNITS: 100 INJECTION, SOLUTION INTRAVENOUS; SUBCUTANEOUS at 16:28

## 2025-08-09 RX ADMIN — PIPERACILLIN SODIUM AND TAZOBACTAM SODIUM 3.38 G: 3; .375 INJECTION, SOLUTION INTRAVENOUS at 23:58

## 2025-08-09 RX ADMIN — VANCOMYCIN HYDROCHLORIDE 1250 MG: 1.25 INJECTION, SOLUTION INTRAVITREAL at 10:56

## 2025-08-09 RX ADMIN — INSULIN LISPRO 8 UNITS: 100 INJECTION, SOLUTION INTRAVENOUS; SUBCUTANEOUS at 20:40

## 2025-08-09 RX ADMIN — INSULIN LISPRO 12 UNITS: 100 INJECTION, SOLUTION INTRAVENOUS; SUBCUTANEOUS at 16:28

## 2025-08-09 RX ADMIN — SODIUM CHLORIDE 150 ML/HR: 0.9 INJECTION, SOLUTION INTRAVENOUS at 01:16

## 2025-08-09 ASSESSMENT — ENCOUNTER SYMPTOMS
BLOOD IN STOOL: 0
CARDIOVASCULAR NEGATIVE: 1
FLANK PAIN: 0
MUSCULOSKELETAL NEGATIVE: 1
DYSURIA: 0
FREQUENCY: 0
NAUSEA: 1
CONSTIPATION: 0
RESPIRATORY NEGATIVE: 1
DIFFICULTY URINATING: 0
SHORTNESS OF BREATH: 0
WEAKNESS: 0
APPETITE CHANGE: 1
ABDOMINAL PAIN: 0
ABDOMINAL DISTENTION: 0
PSYCHIATRIC NEGATIVE: 1
CHEST TIGHTNESS: 0
VOMITING: 0
DIARRHEA: 0
HEMATURIA: 0
DIZZINESS: 0
WOUND: 1
COUGH: 0
CONFUSION: 0
NUMBNESS: 1
EYES NEGATIVE: 1
WHEEZING: 0
HEADACHES: 1
NERVOUS/ANXIOUS: 0

## 2025-08-09 ASSESSMENT — PAIN DESCRIPTION - LOCATION: LOCATION: GENERALIZED

## 2025-08-09 ASSESSMENT — PAIN DESCRIPTION - DESCRIPTORS: DESCRIPTORS: ACHING

## 2025-08-09 ASSESSMENT — PAIN - FUNCTIONAL ASSESSMENT
PAIN_FUNCTIONAL_ASSESSMENT: 0-10

## 2025-08-09 ASSESSMENT — PAIN SCALES - GENERAL
PAINLEVEL_OUTOF10: 6
PAINLEVEL_OUTOF10: 0 - NO PAIN
PAINLEVEL_OUTOF10: 0 - NO PAIN

## 2025-08-09 NOTE — CONSULTS
"Reason For Consult  Perianal abscess    History Of Present Illness  Nia Helms is a 60 y.o. female presenting with right gluteal/perianal pain/abscess, uncontrolled DM, and labial abscess.     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    Surgical History  She has no past surgical history on file.     Social History  She reports that she has been smoking cigarettes. She has never used smokeless tobacco. She reports current drug use. Drug: Marijuana. No history on file for alcohol use.    Family History  Family History[1]     Allergies  Patient has no known allergies.    Review of Systems       Physical Exam  Right gluteal area with 3x2 cm inflammed area with central necrosis of 3mm and sanguinous drainage  minimal tenderness.     Last Recorded Vitals  Blood pressure 121/81, pulse 83, temperature 36.2 °C (97.2 °F), temperature source Temporal, resp. rate 16, height 1.676 m (5' 6\"), weight 68.5 kg (151 lb), SpO2 98%.    Relevant Results  Results for orders placed or performed during the hospital encounter of 08/08/25 (from the past 24 hours)   POCT GLUCOSE   Result Value Ref Range    POCT Glucose >600 (H) 74 - 99 mg/dL   CBC and Auto Differential   Result Value Ref Range    WBC 8.5 4.4 - 11.3 x10*3/uL    nRBC 0.0 0.0 - 0.0 /100 WBCs    RBC 4.58 4.00 - 5.20 x10*6/uL    Hemoglobin 13.8 12.0 - 16.0 g/dL    Hematocrit 39.6 36.0 - 46.0 %    MCV 87 80 - 100 fL    MCH 30.1 26.0 - 34.0 pg    MCHC 34.8 32.0 - 36.0 g/dL    RDW 12.2 11.5 - 14.5 %    Platelets 214 150 - 450 x10*3/uL    Neutrophils % 73.8 40.0 - 80.0 %    Immature " Granulocytes %, Automated 0.4 0.0 - 0.9 %    Lymphocytes % 16.5 13.0 - 44.0 %    Monocytes % 8.0 2.0 - 10.0 %    Eosinophils % 0.9 0.0 - 6.0 %    Basophils % 0.4 0.0 - 2.0 %    Neutrophils Absolute 6.30 1.20 - 7.70 x10*3/uL    Immature Granulocytes Absolute, Automated 0.03 0.00 - 0.70 x10*3/uL    Lymphocytes Absolute 1.41 1.20 - 4.80 x10*3/uL    Monocytes Absolute 0.68 0.10 - 1.00 x10*3/uL    Eosinophils Absolute 0.08 0.00 - 0.70 x10*3/uL    Basophils Absolute 0.03 0.00 - 0.10 x10*3/uL   Comprehensive metabolic panel   Result Value Ref Range    Glucose 715 (HH) 74 - 99 mg/dL    Sodium 127 (L) 136 - 145 mmol/L    Potassium 3.9 3.5 - 5.3 mmol/L    Chloride 94 (L) 98 - 107 mmol/L    Bicarbonate 19 (L) 21 - 32 mmol/L    Anion Gap 18 10 - 20 mmol/L    Urea Nitrogen 15 6 - 23 mg/dL    Creatinine 0.97 0.50 - 1.05 mg/dL    eGFR 68 >60 mL/min/1.73m*2    Calcium 9.1 8.6 - 10.3 mg/dL    Albumin 3.9 3.4 - 5.0 g/dL    Alkaline Phosphatase 82 33 - 110 U/L    Total Protein 7.0 6.4 - 8.2 g/dL    AST 13 9 - 39 U/L    Bilirubin, Total 0.5 0.0 - 1.2 mg/dL    ALT 13 7 - 45 U/L   Lactate   Result Value Ref Range    Lactate 2.1 (H) 0.4 - 2.0 mmol/L   Blood Culture    Specimen: Peripheral Venipuncture; Blood culture   Result Value Ref Range    Blood Culture Loaded on Instrument - Culture in progress    Blood Culture    Specimen: Peripheral Venipuncture; Blood culture   Result Value Ref Range    Blood Culture Loaded on Instrument - Culture in progress    Blood Gas Venous Full Panel   Result Value Ref Range    POCT pH, Venous 7.40 7.33 - 7.43 pH    POCT pCO2, Venous 35 (L) 41 - 51 mm Hg    POCT pO2, Venous 46 (H) 35 - 45 mm Hg    POCT SO2, Venous 83 (H) 45 - 75 %    POCT Oxy Hemoglobin, Venous 77.3 (H) 45.0 - 75.0 %    POCT Hematocrit Calculated, Venous 42.0 36.0 - 46.0 %    POCT Sodium, Venous 125 (L) 136 - 145 mmol/L    POCT Potassium, Venous 4.1 3.5 - 5.3 mmol/L    POCT Chloride, Venous 97 (L) 98 - 107 mmol/L    POCT Ionized Calicum,  Venous 1.19 1.10 - 1.33 mmol/L    POCT Glucose, Venous >685 (HH) 74 - 99 mg/dL    POCT Lactate, Venous 2.4 (H) 0.4 - 2.0 mmol/L    POCT Base Excess, Venous -2.5 (L) -2.0 - 3.0 mmol/L    POCT HCO3 Calculated, Venous 21.7 (L) 22.0 - 26.0 mmol/L    POCT Hemoglobin, Venous 14.0 12.0 - 16.0 g/dL    POCT Anion Gap, Venous 10.0 10.0 - 25.0 mmol/L    Patient Temperature      FiO2 21 %    Critical Called By VANDANA     Critical Called To DR RUIZ     Critical Call Time 1503     Critical Read Back Y    Hemoglobin A1c   Result Value Ref Range    Hemoglobin A1C 16.7 (H) See comment %    Estimated Average Glucose 433 Not Established mg/dL   Beta Hydroxybutyrate   Result Value Ref Range    Beta-Hydroxybutyrate 1.70 (H) 0.02 - 0.27 mmol/L   POCT GLUCOSE   Result Value Ref Range    POCT Glucose >600 (H) 74 - 99 mg/dL   Urinalysis with Reflex Culture and Microscopic   Result Value Ref Range    Color, Urine Colorless (N) Straw, Yellow    Appearance, Urine Clear Clear    Specific Gravity, Urine <1.005 (A) 1.005 - 1.035    pH, Urine 5.0 5.0, 5.5, 6.0, 6.5, 7.0, 7.5, 8.0    Protein, Urine NEGATIVE NEGATIVE, 10 (TRACE) mg/dL    Glucose, Urine 1000 (4+) (A) NEGATIVE mg/dL    Blood, Urine NEGATIVE NEGATIVE mg/dL    Ketones, Urine 10 (1+) (A) NEGATIVE mg/dL    Bilirubin, Urine NEGATIVE NEGATIVE mg/dL    Urobilinogen, Urine NORM NORM mg/dL    Nitrite, Urine NEGATIVE NEGATIVE    Leukocyte Esterase, Urine 25 Deng/uL (A) NEGATIVE   Microscopic Only, Urine   Result Value Ref Range    WBC, Urine 11-20 (A) 1-5, NONE /HPF    RBC, Urine 3-5 NONE, 1-2, 3-5 /HPF    Squamous Epithelial Cells, Urine 1-9 (SPARSE) Reference range not established. /HPF    Mucus, Urine FEW Reference range not established. /LPF   Drug Screen, Urine   Result Value Ref Range    Amphetamine Screen, Urine Presumptive Negative Presumptive Negative    Barbiturate Screen, Urine Presumptive Negative Presumptive Negative    Benzodiazepines Screen, Urine Presumptive Negative Presumptive  Negative    Cannabinoid Screen, Urine Presumptive Negative Presumptive Negative    Cocaine Metabolite Screen, Urine Presumptive Negative Presumptive Negative    Fentanyl Screen, Urine Presumptive Negative Presumptive Negative    Opiate Screen, Urine Presumptive Negative Presumptive Negative    Oxycodone Screen, Urine Presumptive Negative Presumptive Negative    PCP Screen, Urine Presumptive Negative Presumptive Negative    Methadone Screen, Urine Presumptive Negative Presumptive Negative   Blood Gas Lactic Acid, Venous   Result Value Ref Range    POCT Lactate, Venous 2.1 (H) 0.4 - 2.0 mmol/L   Lactate   Result Value Ref Range    Lactate 2.2 (H) 0.4 - 2.0 mmol/L   POCT GLUCOSE   Result Value Ref Range    POCT Glucose 330 (H) 74 - 99 mg/dL   Lactate   Result Value Ref Range    Lactate 1.0 0.4 - 2.0 mmol/L   POCT GLUCOSE   Result Value Ref Range    POCT Glucose 448 (H) 74 - 99 mg/dL   POCT GLUCOSE   Result Value Ref Range    POCT Glucose 401 (H) 74 - 99 mg/dL   Basic metabolic panel   Result Value Ref Range    Glucose 265 (H) 74 - 99 mg/dL    Sodium 137 136 - 145 mmol/L    Potassium 3.4 (L) 3.5 - 5.3 mmol/L    Chloride 105 98 - 107 mmol/L    Bicarbonate 22 21 - 32 mmol/L    Anion Gap 13 10 - 20 mmol/L    Urea Nitrogen 11 6 - 23 mg/dL    Creatinine 0.66 0.50 - 1.05 mg/dL    eGFR >90 >60 mL/min/1.73m*2    Calcium 8.5 (L) 8.6 - 10.3 mg/dL   TSH with reflex to Free T4 if abnormal   Result Value Ref Range    Thyroid Stimulating Hormone 1.19 0.44 - 3.98 mIU/L   Magnesium   Result Value Ref Range    Magnesium 1.82 1.60 - 2.40 mg/dL   Renal Function Panel   Result Value Ref Range    Glucose 234 (H) 74 - 99 mg/dL    Sodium 136 136 - 145 mmol/L    Potassium 3.7 3.5 - 5.3 mmol/L    Chloride 107 98 - 107 mmol/L    Bicarbonate 21 21 - 32 mmol/L    Anion Gap 12 10 - 20 mmol/L    Urea Nitrogen 10 6 - 23 mg/dL    Creatinine 0.59 0.50 - 1.05 mg/dL    eGFR >90 >60 mL/min/1.73m*2    Calcium 8.3 (L) 8.6 - 10.3 mg/dL    Phosphorus 2.8 2.5  - 4.9 mg/dL    Albumin 3.2 (L) 3.4 - 5.0 g/dL   CBC   Result Value Ref Range    WBC 7.7 4.4 - 11.3 x10*3/uL    nRBC 0.0 0.0 - 0.0 /100 WBCs    RBC 4.09 4.00 - 5.20 x10*6/uL    Hemoglobin 12.3 12.0 - 16.0 g/dL    Hematocrit 35.6 (L) 36.0 - 46.0 %    MCV 87 80 - 100 fL    MCH 30.1 26.0 - 34.0 pg    MCHC 34.6 32.0 - 36.0 g/dL    RDW 12.0 11.5 - 14.5 %    Platelets 168 150 - 450 x10*3/uL   POCT GLUCOSE   Result Value Ref Range    POCT Glucose 269 (H) 74 - 99 mg/dL   POCT GLUCOSE   Result Value Ref Range    POCT Glucose 290 (H) 74 - 99 mg/dL     CT pelvis w IV contrast  Result Date: 8/8/2025  Interpreted By:  Jose Fang, STUDY: CT PELVIS W IV CONTRAST;  8/8/2025 4:56 pm   INDICATION: Signs/Symptoms:perirectal abscess and abscess on labia.   COMPARISON: CT scan of the pelvis 05/14/2020.   ACCESSION NUMBER(S): [id number]   ORDERING CLINICIAN: DIANA BRAY   TECHNIQUE: Axial CT images of the pelvis with coronal and sagittal reconstructed images obtained after intravenous administration of 75 mL of Omnipaque 350   FINDINGS: KIDNEYS and URETERS: Visualized lower pole of the kidneys are unremarkable. No hydroureter.   VESSELS:  Calcific atherosclerosis of the aortoiliac vessels. No aneurysmal dilatation. RETROPERITONEUM: Mildly enlarged inguinal lymph nodes measure up to 1.6 cm in short axis.   PELVIS:   REPRODUCTIVE ORGANS: Uterus is present. No adnexal mass. BLADDER: Within normal limits.   BOWEL: No dilated bowel. Postsurgical changes in the right lower quadrant likely relate to prior appendectomy. PERITONEUM: No ascites or free air, no fluid collection.   ABDOMINAL WALL: There is inflammatory stranding along the medial gluteal fold bilaterally. There is a subtle 1.0 x 1.7 cm hypodense focus along the left labia. A 0.9 x 1.8 cm low attenuating focus is noted along the right medial gluteal fold inferior to the anorectum. These findings are concerning for cellulitis and phlegmonous change/early developing abscess. No  locules of free air are noted in the soft tissues of the groin. Small umbilical hernia contains fat. BONES: Multilevel degenerative changes of the spine. Bilateral hip osteoarthrosis.       There is inflammatory stranding along the medial gluteal fold bilaterally. There is a 0.9 x 1.8 cm low attenuating focus along the right medial gluteal fold just inferior to the anorectum. There is a subtle 1.0 x 1.7 cm hypodense focus along the left labia. These findings are concerning for cellulitis and phlegmonous change/early developing abscess. No locules of free air are noted in the soft tissues of the groin.   Mildly enlarged bilateral inguinal lymph nodes are likely reactive.   Additional findings as noted above.   MACRO: None   Signed by: Jose Fang 8/8/2025 5:43 PM Dictation workstation:   [id number]    I have personally reviewed the CT findings and agree with the radiologist description.     Assessment/Plan     Right gluteal abscess - bedside incision and drainage.  Discussed with patient and she desired to proceed without any local anesthesia.  A gentle spread of the opening allowed probing of the cavity.  Minimal pus.  No track, and adequate drainage.  Dressing applied.      No ongoing surgical issue.    Continue antibiotics for 10 days (can transition to oral at any point)  Ok for shower/bath  Keep dry dressing in place.  Surgery will sign off.  Please reconsult with any new developments.    I spent 40 minutes in the professional and overall care of this patient.      Erick Zhao MD         [1] No family history on file.

## 2025-08-09 NOTE — PROGRESS NOTES
"Nia Helms is a 60 y.o. female on day 1 of admission presenting with Hyperglycemia.    Subjective   Patient seen and examined at bedside this morning. No pain. She was nauseous after eating and did have 2-3 emesis. No ab pain. No chest pain. No more fevers/chills.    Objective     Physical Exam    Constitutional: A&Ox4, NAD, resting comfortable   Head and Face: Atraumatic, normocephalic   Eyes: Normal external exam, EOMI  ENT: Normal external inspection of ears and nose. Oropharynx normal.  Cardiovascular: RRR, S1/S2, no murmurs, rubs, or gallops, radial pulses +2  Pulmonary: CTAB, no respiratory distress, no wheezing, rales or rhonchi, on RA  Abdomen: +BS, soft, non-tender, nondistended, no guarding rigidity or rebound tenderness, no masses noted  MSK: Negative for edema, No joint swelling, normal movements of all extremities.   Neuro: No focal deficits, normal motor function, normal sensation, follows all commands  Skin- left labial abscess open with minimal bloody drainage, erythematous skin surrounding   Psychiatric: Judgment intact. Appropriate mood, affect and behavior    Last Recorded Vitals  Blood pressure 121/81, pulse 83, temperature 36.2 °C (97.2 °F), temperature source Temporal, resp. rate 16, height 1.676 m (5' 6\"), weight 68.5 kg (151 lb), SpO2 98%.  Intake/Output last 3 Shifts:  No intake/output data recorded.    Relevant Results  Scheduled medications  Scheduled Medications[1]  Continuous medications  Continuous Medications[2]  PRN medications  PRN Medications[3]  Results from last 7 days   Lab Units 08/09/25  0231 08/08/25  1453   WBC AUTO x10*3/uL 7.7 8.5   RBC AUTO x10*6/uL 4.09 4.58   HEMOGLOBIN g/dL 12.3 13.8     Results from last 7 days   Lab Units 08/09/25  0231 08/09/25  0025 08/08/25  1453   SODIUM mmol/L 136 137 127*   POTASSIUM mmol/L 3.7 3.4* 3.9   CHLORIDE mmol/L 107 105 94*   CO2 mmol/L 21 22 19*   BUN mg/dL 10 11 15   CREATININE mg/dL 0.59 0.66 0.97   CALCIUM mg/dL 8.3* 8.5* 9.1 "   PHOSPHORUS mg/dL 2.8  --   --    MAGNESIUM mg/dL 1.82  --   --    BILIRUBIN TOTAL mg/dL  --   --  0.5   ALT U/L  --   --  13   AST U/L  --   --  13       Imaging  CT pelvis w IV contrast  Result Date: 8/8/2025  There is inflammatory stranding along the medial gluteal fold bilaterally. There is a 0.9 x 1.8 cm low attenuating focus along the right medial gluteal fold just inferior to the anorectum. There is a subtle 1.0 x 1.7 cm hypodense focus along the left labia. These findings are concerning for cellulitis and phlegmonous change/early developing abscess. No locules of free air are noted in the soft tissues of the groin.   Mildly enlarged bilateral inguinal lymph nodes are likely reactive.   Additional findings as noted above.   MACRO: None   Signed by: Jose Fang 8/8/2025 5:43 PM Dictation workstation:   [id number]      Cardiology, Vascular, and Other Imaging  No other imaging results found for the past 2 days       Assessment/Plan   Assessment & Plan  Diabetic ketoacidosis without coma associated with type 2 diabetes mellitus    Ms. Nia Helms is a 60 y.o. female w/ hx of uncontrolled non-compliant T2DM (A1c 16) and recurrent rectal/labial abscesses presented w/ fever and nausea with labial/rectal abscesses admitted with mild DKA that has now resolved; requiring IV antibiotics and multiple consultants.     #Mild DKA (resolved)  #Uncontrolled non-compliant T2DM  -GAP 14 with elevated beta hydroxybutyrate and severe hyperglycemia  -GAP has since closed following insulin and fluid administration  -Lantus 20 units nightly with ISS#4  -Reglan prn for n/v w/ suspected gastroparesis  -A1c 16 at this admission, concern for severe uncontrolled diabetes with large weight loss  -Endocrinology consulted for guidance on diabetes regimen, she will also need outpatient follow up  -Psychiatry consulted with concern of depression or diabetic associated eating disorder  -BS ACHS  -Diabetes educator    #Recurrent Perirectal  and Labial abscess w/ surrounding cellulitis  -General Surgery consulted s/p aspiration of R perirectal abscess  -OB/GYN consulted, recs appreciated  -Wound culture pending from labial abscess  -Follow blood cultures  -Continue Zosyn/Vanc    #Pseudohyponatremia 2/2 hyperglycemia  -Resolved after correction of sugars      Global Plan of Care  IVF:  Diet: Diabetic   DVT prophylaxis: lovenox  Dispo: Remain in hospital for 1-2 days  Consults: endo, psych, gen surgey, obgyn  Code Status: DNR/DNI      Patient seen and discussed with the attending.  To be staffed with attending.  Signature: Felipe Wilcox, DO  Internal Medicine PGY-3 Resident           [1] insulin glargine, 20 Units, subcutaneous, Nightly  insulin lispro, 0-20 Units, subcutaneous, Before meals & nightly  piperacillin-tazobactam, 3.375 g, intravenous, q8h  vancomycin, 1,250 mg, intravenous, q12h    [2] sodium chloride 0.9%, 100 mL/hr, Last Rate: 100 mL/hr (08/09/25 4123)    [3] PRN medications: dextrose, dextrose, glucagon, glucagon, metoclopramide, vancomycin

## 2025-08-09 NOTE — PROGRESS NOTES
08/09/25 1634   Discharge Planning   Living Arrangements Alone   Support Systems Family members   Type of Residence Private residence   Home or Post Acute Services None   Financial Resource Strain   How hard is it for you to pay for the very basics like food, housing, medical care, and heating? Somewhat   Intensity of Service   Intensity of Service 0-30 min     Reviewed chart and met with pt. Introduced self and role. Pt is from home alone. She report she is independent with care and still drives. Pt reports some financial strain. SW discuss community resources. Pt reports she has tried to access services in the past, but she is making to much money. She is currently not on any medications, but need assistance with medication at discharge. SW to follow.

## 2025-08-09 NOTE — CONSULTS
Consults    Reason For Consult  Evaluation of eating disorders and adjustment disorder     History Of Present Illness  Ms. Nia Helms is a 60 y.o. female who presents with hyperglycemia, a febrile event on 8/7, and a left labial and right perirectal abscess. PMHx of uncontrolled type 2 diabetes mellitus c/b peripheral neuropathy of the hands, recurrent vaginal yeast infections, and recurrent labial and perirectal abscesses. She had a recent visit in 04/2025 at [facility] ED for similar concerns over hyperglycemia and the left labial and right perirectal abscess. She is admitted for hyperglycemia and DKA rule out, in addition to care for her labial and perirectal abscesses.       Nia, a 60-year-old female with diabetes, presented for evaluation of depression and anxiety related to diabetes management. She had recently discontinued insulin due to perceived ineffectiveness and weight gain concerns, resulting in significant weight loss after stopping her insulin.  Nai denied depression beyond normal levels and reported only typical anxiety. She briefly attended therapy after her first 's death but considered this normal grief processing. Nia expressed interest in GLP-1 receptor agonists for diabetes management as suggested by an ER doctor. The plan included considering GLP-1 agonists and obtaining referrals for a new endocrinologist and general practitioner for ongoing diabetes management.     She reports no significant depression or anxiety beyond what she considers normal. Nia has recently lost a significant amount of weight after discontinuing her insulin, which she states was not effectively lowering her blood glucose and was causing weight gain.    Nia reports feeling hungry since being in the hospital but generally eats healthy. She denies any eating disorders or body image issues. Nia mentions feeling better since losing weight, with this hospitalization being her only recent health  concern. She expresses interest in potentially trying GLP medications for diabetes management and weight control, as suggested by an emergency room doctor.    Regarding her diabetes management, Nia reports discontinuing insulin use a couple of months ago due to ineffectiveness and weight gain concerns. She states that her blood glucose readings were not improving with insulin use. Nia mentions losing a significant amount of weight in a short period after stopping insulin. She acknowledges the need to maintain her health but expresses reluctance to gain weight back.    Nia reports no current psychiatric medication use or ongoing therapy. She briefly saw a therapist following her first 's death but considered it part of normal grief processing. Nia has a boyfriend but does not live with him. She has a sister for family support.    Family History  The patient mentions having a sister with whom she communicates via text. The patient's first  is , though no details about the cause or timing of his death are provided. No other specific family history information is mentioned in the transcript.    Medical History  - Diabetes mellitus, type unspecified  - Brief period of therapy following death of first  for normal grief reaction    Review of Systems  General: Negative for weight loss.  Gastrointestinal: Positive for increased appetite.  Psychiatric: Negative for depression. Positive for normal anxiety.    Social History  - Living Situation: Has a boyfriend but does not live together  - Relationship Status: Currently in a relationship with a boyfriend  - Family Support: Has a sister who keeps in contact via text  - Geographic History: Born and raised in [place], grew up in [place]  - Mental Health History: Saw a therapist briefly after first 's death for grief  - Diet: Reports eating healthy generally  - Weight Management: Recently lost significant weight in a short  "period      Mental Status Examination  - Behavior: Cooperative with the evaluation.  - Speech: Normal rate and volume.  - Mood: \"I have the normal anxieties that I think anybody has\" and \"I'm not depressed\"  - Thought Process: Linear and goal-directed.  - Thought Content: Denies eating disorders. No SI/hi. No AVH.   - Cognition: Alert and oriented to place (aware she is in the hospital).  - Insight: Fair insight into her medical condition. Recognizes the need to find new doctors and considers alternative treatments for diabetes management.  - Judgment: Fair. Understands the potential consequences of not taking insulin but made an informed decision based on perceived side effects and lack of efficacy.        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    Surgical History  She has no past surgical history on file.     Social History  She reports that she has been smoking cigarettes. She has never used smokeless tobacco. She reports current drug use. Drug: Marijuana. No history on file for alcohol use.    Family History  Family History[1]     Allergies  Patient has no known allergies.    Review of Systems  As per HPI          Last Recorded Vitals  /81 (BP Location: Right arm, Patient Position: Lying)   Pulse 83   Temp 36.2 °C (97.2 °F) (Temporal)   Resp 16   Wt 68.5 kg (151 lb)   SpO2 98%       Risk and Safety Assessment  - Current Risk:   - No current suicidal ideation or previous attempts reported   - No current self-harm ideation or previous attempts reported   - No current homicidal " ideation or previous ideation reported   - Non-adherence to insulin medication    - Protective Factors:   - Has a boyfriend, though they don't live together   - Has a sister who checks on her   - Previously saw a therapist for grief after first 's death    Problem List  - Type 2 Diabetes Mellitus  - Anxiety    Assessment  The patient is a female with a history of diabetes mellitus who has been non-adherent to insulin therapy. She reports discontinuing insulin due to perceived ineffectiveness in lowering blood glucose and associated weight gain. The patient has experienced significant weight loss since stopping insulin, which she views positively. She denies any current symptoms of depression or anxiety beyond what she considers normal. The patient has no history of suicide attempts, psychiatric hospitalizations, or current use of psychiatric medications. She briefly attended therapy following the death of her first  but attributes this to normal grief. The patient denies any eating disorders or body image concerns. She reports feeling generally healthy despite her current hospitalization and expresses interest in exploring GLP-1 agonists as a potential treatment option for her diabetes.    Plan  - Consider GLP-1 receptor agonists as a potential treatment option for diabetes management and weight control  - Follow up with a new endocrinologist for diabetes management  - Obtain referrals for new healthcare providers, including an endocrinologist and general practitioner    Medical Decision Making  - Nia is a female patient with a history of diabetes, presenting for psychiatric evaluation in the context of non-adherence to insulin therapy.    - Patient reports no significant depression or anxiety beyond normal levels, and denies any eating disorders.    - The decision to discontinue insulin was not related to psychiatric issues or eating disorders, but rather due to perceived ineffectiveness and weight  gain concerns.    - Patient reports feeling better and experiencing significant weight loss since stopping insulin, suggesting a potential misunderstanding of diabetes management goals.    - Consideration of GLP-1 receptor agonists was mentioned as a potential alternative treatment option that could help manage blood sugar while supporting weight management goals.    - The complexity of the case lies in balancing the patient's perceived improvement with the medical necessity of proper diabetes management, requiring a multidisciplinary approach involving endocrinology and primary care.      Chad White MD             [1] No family history on file.

## 2025-08-09 NOTE — CONSULTS
Vancomycin Dosing by Pharmacy- INITIAL    Nia Helms is a 60 y.o. year old female who Pharmacy has been consulted for vancomycin dosing for cellulitis, skin and soft tissue. Based on the patient's indication and renal status this patient will be dosed based on a goal AUC of 400-600.     Renal function is currently stable.    Visit Vitals  /78 (BP Location: Right arm, Patient Position: Lying)   Pulse 91   Temp 36.7 °C (98.1 °F) (Temporal)   Resp 16        Lab Results   Component Value Date    CREATININE 0.59 2025    CREATININE 0.66 2025    CREATININE 0.97 2025    CREATININE 2.56 (H) 06/15/2020    CREATININE 2.93 (H) 2020    CREATININE 2.99 (H) 2020    CREATININE 2.69 (H) 2020        Patient weight is as follows:   Vitals:    25 1409   Weight: 68.5 kg (151 lb)       Cultures:  No results found for the encounter in last 14 days.        No intake/output data recorded.  I/O during current shift:  No intake/output data recorded.    Temp (24hrs), Av.7 °C (98.1 °F), Min:36.6 °C (97.9 °F), Max:36.9 °C (98.4 °F)         Assessment/Plan     Patient will not be given a loading dose.  Will initiate vancomycin maintenance, 1250 mg every 12 hours.    This dosing regimen is predicted by [employer] to result in the following pharmacokinetic parameters:  Loading dose: N/A  Regimen: 1250 mg IV every 12 hours.  Start time: 09:00 on 2025  Exposure target: AUC24 (range) 400-600 mg/L.hr   [id number]: 485 mg/L.hr  AUC24,ss: 576 mg/L.hr  Probability of AUC24 > 400: 84 %  Ctrough,ss: 17.1 mg/L  Probability of Ctrough,ss > 20: 38 %    Follow-up level will be ordered on 8/10 at 0500 unless clinically indicated sooner.  Will continue to monitor renal function daily while on vancomycin and order serum creatinine at least every 48 hours if not already ordered.  Follow for continued vancomycin needs, clinical response, and signs/symptoms of toxicity.       Zachary Cadet, PharmD

## 2025-08-09 NOTE — CONSULTS
GYN Consult    Reason for Consult: Labial abscess     Assessment/Plan    61 yo  female with left groin abscess as well as right gluteal abscess in setting of uncontrolled type II DM.    - Left groin abscess - Pt states that left labia majora swelling has already significantly decreased in size compared to when she initially came in.  There was spontaneous drainage from the left groin previously - minimal drainage now.  - On palpation, she has minimal tenderness and there are areas of induration, but nothing fluctuant indicating a need for I&D in this area at this time.  - Continue antibiotics - in regards to the labial/groin abscess alone, would transition to PO abx after 24-48 hours pending management/clinical condition/plan for the concurrent gluteal abscess.    - Management of gluteal abscess per surgery team  - Call GYN back if labial abscess seems to be worsening or new clinical findings develop - will otherwise sign off.    Subjective   Pt presented to the ED with left labial and right gluteal/perirectal abscess. She also developed fevers and nausea as well as the worsening swelling.  She noticed purulent drainage from the left groin area and says that her left labia majora initially was twice as large as her right labia majora.      She had a prior similar episode and was admitted to [facility] for this where spontaneous drainage occurred and she did not need I&D.    Obstetrical History    - SAB x 1     Past Medical History  Type II DM    Past Surgical History   Appendectomy     Social History  Social History     Tobacco Use    Smoking status: Every Day     Types: Cigarettes    Smokeless tobacco: Never   Substance Use Topics    Alcohol use: Not on file     Substance and Sexual Activity   Drug Use Yes    Types: Marijuana     Allergies  Patient has no known allergies.     Medications  Prescriptions Prior to Admission[1]    Objective    Last Vitals  Temp Pulse Resp BP MAP O2 Sat   36.2 °C (97.2 °F) 83 16  121/81 105 98 %     CT Scan 8/8:  There is a subtle 1.0 x 1.7 cm hypodense  focus along the left labia. A 0.9 x 1.8 cm low attenuating focus is  noted along the right medial gluteal fold inferior to the anorectum.  These findings are concerning for cellulitis and phlegmonous  change/early developing abscess. No locules of free air are noted in the soft tissues of the groin.     Physical Examination  General Appearance   - consistent with stated age, well groomed and cooperative    Integumentary  - skin warm and dry    Head and Neck  - normalocephalic and neck supple    Chest and Lung Exam  - normal breathing effort, no respiratory distress    Female Genitourinary  - left labia majora with mild swelling and area of induration in the inferior left labia majora and left groin area.  Small opening in left groin which does not appear to be currently draining. No areas of fluctuation noted.  Skin mildly erythematous and mildly tender to palpation.    Peripheral Vascular  - no edema present    Lab Review  Lab Results   Component Value Date    WBC 7.7 08/09/2025    HGB 12.3 08/09/2025    HCT 35.6 (L) 08/09/2025     08/09/2025     Lab Results   Component Value Date    GLUCOSE 234 (H) 08/09/2025     08/09/2025    K 3.7 08/09/2025     08/09/2025    CO2 21 08/09/2025    ANIONGAP 12 08/09/2025    BUN 10 08/09/2025    CREATININE 0.59 08/09/2025    EGFR >90 08/09/2025    CALCIUM 8.3 (L) 08/09/2025    ALBUMIN 3.2 (L) 08/09/2025    PROT 7.0 08/08/2025    ALKPHOS 82 08/08/2025    ALT 13 08/08/2025    AST 13 08/08/2025    BILITOT 0.5 08/08/2025          [1]   No medications prior to admission.

## 2025-08-10 VITALS
TEMPERATURE: 97.7 F | HEART RATE: 71 BPM | OXYGEN SATURATION: 98 % | BODY MASS INDEX: 24.27 KG/M2 | SYSTOLIC BLOOD PRESSURE: 119 MMHG | RESPIRATION RATE: 16 BRPM | HEIGHT: 66 IN | WEIGHT: 151 LBS | DIASTOLIC BLOOD PRESSURE: 80 MMHG

## 2025-08-10 LAB
ALBUMIN SERPL BCP-MCNC: 3.6 G/DL (ref 3.4–5)
ANION GAP SERPL CALC-SCNC: 13 MMOL/L (ref 10–20)
BACTERIA BLD AEROBE CULT: ABNORMAL
BACTERIA BLD CULT: ABNORMAL
BACTERIA BLD CULT: NORMAL
BACTERIA UR CULT: ABNORMAL
BUN SERPL-MCNC: 11 MG/DL (ref 6–23)
CALCIUM SERPL-MCNC: 9 MG/DL (ref 8.6–10.3)
CHLORIDE SERPL-SCNC: 101 MMOL/L (ref 98–107)
CO2 SERPL-SCNC: 25 MMOL/L (ref 21–32)
CREAT SERPL-MCNC: 0.63 MG/DL (ref 0.5–1.05)
EGFRCR SERPLBLD CKD-EPI 2021: >90 ML/MIN/1.73M*2
ERYTHROCYTE [DISTWIDTH] IN BLOOD BY AUTOMATED COUNT: 12.3 % (ref 11.5–14.5)
GLUCOSE BLD MANUAL STRIP-MCNC: 123 MG/DL (ref 74–99)
GLUCOSE BLD MANUAL STRIP-MCNC: 214 MG/DL (ref 74–99)
GLUCOSE BLD MANUAL STRIP-MCNC: 247 MG/DL (ref 74–99)
GLUCOSE BLD MANUAL STRIP-MCNC: 314 MG/DL (ref 74–99)
GLUCOSE SERPL-MCNC: 296 MG/DL (ref 74–99)
GRAM STN SPEC: ABNORMAL
HCT VFR BLD AUTO: 41.5 % (ref 36–46)
HGB BLD-MCNC: 13.9 G/DL (ref 12–16)
HOLD SPECIMEN: NORMAL
MCH RBC QN AUTO: 29.4 PG (ref 26–34)
MCHC RBC AUTO-ENTMCNC: 33.5 G/DL (ref 32–36)
MCV RBC AUTO: 88 FL (ref 80–100)
NRBC BLD-RTO: 0 /100 WBCS (ref 0–0)
PHOSPHATE SERPL-MCNC: 3.7 MG/DL (ref 2.5–4.9)
PLATELET # BLD AUTO: 231 X10*3/UL (ref 150–450)
POTASSIUM SERPL-SCNC: 3.8 MMOL/L (ref 3.5–5.3)
RBC # BLD AUTO: 4.73 X10*6/UL (ref 4–5.2)
SODIUM SERPL-SCNC: 135 MMOL/L (ref 136–145)
VANCOMYCIN SERPL-MCNC: 13.2 UG/ML (ref 5–20)
WBC # BLD AUTO: 6.2 X10*3/UL (ref 4.4–11.3)

## 2025-08-10 PROCEDURE — 2500000004 HC RX 250 GENERAL PHARMACY W/ HCPCS (ALT 636 FOR OP/ED): Performed by: STUDENT IN AN ORGANIZED HEALTH CARE EDUCATION/TRAINING PROGRAM

## 2025-08-10 PROCEDURE — 2500000001 HC RX 250 WO HCPCS SELF ADMINISTERED DRUGS (ALT 637 FOR MEDICARE OP)

## 2025-08-10 PROCEDURE — 84100 ASSAY OF PHOSPHORUS: CPT

## 2025-08-10 PROCEDURE — 87070 CULTURE OTHR SPECIMN AEROBIC: CPT | Mod: STJLAB

## 2025-08-10 PROCEDURE — 2500000002 HC RX 250 W HCPCS SELF ADMINISTERED DRUGS (ALT 637 FOR MEDICARE OP, ALT 636 FOR OP/ED): Performed by: INTERNAL MEDICINE

## 2025-08-10 PROCEDURE — 36415 COLL VENOUS BLD VENIPUNCTURE: CPT | Performed by: STUDENT IN AN ORGANIZED HEALTH CARE EDUCATION/TRAINING PROGRAM

## 2025-08-10 PROCEDURE — 82947 ASSAY GLUCOSE BLOOD QUANT: CPT

## 2025-08-10 PROCEDURE — 1100000001 HC PRIVATE ROOM DAILY

## 2025-08-10 PROCEDURE — 85027 COMPLETE CBC AUTOMATED: CPT

## 2025-08-10 PROCEDURE — 80069 RENAL FUNCTION PANEL: CPT

## 2025-08-10 PROCEDURE — 2500000004 HC RX 250 GENERAL PHARMACY W/ HCPCS (ALT 636 FOR OP/ED)

## 2025-08-10 PROCEDURE — 80202 ASSAY OF VANCOMYCIN: CPT | Performed by: STUDENT IN AN ORGANIZED HEALTH CARE EDUCATION/TRAINING PROGRAM

## 2025-08-10 PROCEDURE — 99233 SBSQ HOSP IP/OBS HIGH 50: CPT

## 2025-08-10 PROCEDURE — 2500000002 HC RX 250 W HCPCS SELF ADMINISTERED DRUGS (ALT 637 FOR MEDICARE OP, ALT 636 FOR OP/ED)

## 2025-08-10 RX ORDER — TRAZODONE HYDROCHLORIDE 50 MG/1
50 TABLET ORAL NIGHTLY
Status: DISPENSED | OUTPATIENT
Start: 2025-08-10

## 2025-08-10 RX ORDER — INSULIN GLARGINE 100 [IU]/ML
30 INJECTION, SOLUTION SUBCUTANEOUS NIGHTLY
Status: DISPENSED | OUTPATIENT
Start: 2025-08-10

## 2025-08-10 RX ORDER — INSULIN GLARGINE 100 [IU]/ML
10 INJECTION, SOLUTION SUBCUTANEOUS ONCE
Status: DISPENSED | OUTPATIENT
Start: 2025-08-10

## 2025-08-10 RX ORDER — INSULIN LISPRO 100 [IU]/ML
10 INJECTION, SOLUTION INTRAVENOUS; SUBCUTANEOUS
Status: DISPENSED | OUTPATIENT
Start: 2025-08-10

## 2025-08-10 RX ORDER — INSULIN LISPRO 100 [IU]/ML
0-20 INJECTION, SOLUTION INTRAVENOUS; SUBCUTANEOUS
Status: DISCONTINUED | OUTPATIENT
Start: 2025-08-10 | End: 2025-08-10

## 2025-08-10 RX ORDER — INSULIN LISPRO 100 [IU]/ML
0-10 INJECTION, SOLUTION INTRAVENOUS; SUBCUTANEOUS NIGHTLY
Status: DISPENSED | OUTPATIENT
Start: 2025-08-10

## 2025-08-10 RX ORDER — POLYETHYLENE GLYCOL 3350 17 G/17G
17 POWDER, FOR SOLUTION ORAL DAILY
Status: DISPENSED | OUTPATIENT
Start: 2025-08-10

## 2025-08-10 RX ORDER — INSULIN LISPRO 100 [IU]/ML
0-20 INJECTION, SOLUTION INTRAVENOUS; SUBCUTANEOUS
Status: DISPENSED | OUTPATIENT
Start: 2025-08-10

## 2025-08-10 RX ADMIN — INSULIN LISPRO 10 UNITS: 100 INJECTION, SOLUTION INTRAVENOUS; SUBCUTANEOUS at 12:46

## 2025-08-10 RX ADMIN — INSULIN LISPRO 8 UNITS: 100 INJECTION, SOLUTION INTRAVENOUS; SUBCUTANEOUS at 12:47

## 2025-08-10 RX ADMIN — INSULIN LISPRO 10 UNITS: 100 INJECTION, SOLUTION INTRAVENOUS; SUBCUTANEOUS at 09:06

## 2025-08-10 RX ADMIN — INSULIN LISPRO 4 UNITS: 100 INJECTION, SOLUTION INTRAVENOUS; SUBCUTANEOUS at 22:00

## 2025-08-10 RX ADMIN — ACETAMINOPHEN 975 MG: 325 TABLET ORAL at 22:28

## 2025-08-10 RX ADMIN — PIPERACILLIN SODIUM AND TAZOBACTAM SODIUM 3.38 G: 3; .375 INJECTION, SOLUTION INTRAVENOUS at 15:51

## 2025-08-10 RX ADMIN — VANCOMYCIN HYDROCHLORIDE 1250 MG: 1.25 INJECTION, SOLUTION INTRAVITREAL at 22:31

## 2025-08-10 RX ADMIN — INSULIN LISPRO 16 UNITS: 100 INJECTION, SOLUTION INTRAVENOUS; SUBCUTANEOUS at 09:08

## 2025-08-10 RX ADMIN — FLUCONAZOLE 150 MG: 150 TABLET ORAL at 08:57

## 2025-08-10 RX ADMIN — PIPERACILLIN SODIUM AND TAZOBACTAM SODIUM 3.38 G: 3; .375 INJECTION, SOLUTION INTRAVENOUS at 22:31

## 2025-08-10 RX ADMIN — VANCOMYCIN HYDROCHLORIDE 1250 MG: 1.25 INJECTION, SOLUTION INTRAVITREAL at 12:47

## 2025-08-10 RX ADMIN — INSULIN GLARGINE 30 UNITS: 100 INJECTION, SOLUTION SUBCUTANEOUS at 22:00

## 2025-08-10 RX ADMIN — INSULIN LISPRO 10 UNITS: 100 INJECTION, SOLUTION INTRAVENOUS; SUBCUTANEOUS at 18:05

## 2025-08-10 RX ADMIN — PIPERACILLIN SODIUM AND TAZOBACTAM SODIUM 3.38 G: 3; .375 INJECTION, SOLUTION INTRAVENOUS at 08:59

## 2025-08-10 ASSESSMENT — COGNITIVE AND FUNCTIONAL STATUS - GENERAL
DAILY ACTIVITIY SCORE: 24
MOBILITY SCORE: 23
DAILY ACTIVITIY SCORE: 24
CLIMB 3 TO 5 STEPS WITH RAILING: A LITTLE
MOBILITY SCORE: 23
CLIMB 3 TO 5 STEPS WITH RAILING: A LITTLE

## 2025-08-10 ASSESSMENT — PAIN SCALES - GENERAL
PAINLEVEL_OUTOF10: 0 - NO PAIN
PAINLEVEL_OUTOF10: 0 - NO PAIN

## 2025-08-10 ASSESSMENT — ACTIVITIES OF DAILY LIVING (ADL)
FEEDING YOURSELF: INDEPENDENT
BATHING: INDEPENDENT
GROOMING: INDEPENDENT
HEARING - LEFT EAR: FUNCTIONAL
HEARING - RIGHT EAR: FUNCTIONAL
ADEQUATE_TO_COMPLETE_ADL: YES
PATIENT'S MEMORY ADEQUATE TO SAFELY COMPLETE DAILY ACTIVITIES?: YES
WALKS IN HOME: INDEPENDENT
JUDGMENT_ADEQUATE_SAFELY_COMPLETE_DAILY_ACTIVITIES: YES
TOILETING: INDEPENDENT
DRESSING YOURSELF: INDEPENDENT

## 2025-08-10 NOTE — PROGRESS NOTES
End of shift note, no acute changes this shift. Patient resting in bed tolerating diet and meds well. Iv vanco and zosyn maintained. Wound swab sent this shift. Patient glucose monitored and insulin given

## 2025-08-10 NOTE — PROGRESS NOTES
"Nia Helms is a 60 y.o. female on day 2 of admission presenting with Diabetic ketoacidosis without coma associated with type 2 diabetes mellitus.    Subjective   Patient seen and examined at bedside this morning. No pain, denies n/v. She feels rectal abscess is slightly larger today. Overall improved symptoms today.     Objective     Physical Exam    Constitutional: A&Ox4, NAD, resting comfortable   Head and Face: Atraumatic, normocephalic   Eyes: Normal external exam, EOMI  ENT: Normal external inspection of ears and nose. Oropharynx normal.  Cardiovascular: RRR, S1/S2, no murmurs, rubs, or gallops, radial pulses +2  Pulmonary: CTAB, no respiratory distress, no wheezing, rales or rhonchi, on RA  Abdomen: +BS, soft, non-tender, nondistended, no guarding rigidity or rebound tenderness, no masses noted  MSK: Negative for edema, No joint swelling, normal movements of all extremities.   Neuro: No focal deficits, normal motor function, normal sensation, follows all commands  Skin- left labial abscess open w/o drainage today, erythematous skin surrounding is actually improving. Rectal abscess is incised   Psychiatric: Judgment intact. Appropriate mood, affect and behavior    Last Recorded Vitals  Blood pressure 114/82, pulse 84, temperature 36.4 °C (97.5 °F), temperature source Temporal, resp. rate 18, height 1.676 m (5' 6\"), weight 68.5 kg (151 lb), SpO2 97%.  Intake/Output last 3 Shifts:  No intake/output data recorded.    Relevant Results  Scheduled medications  Scheduled Medications[1]  Continuous medications  Continuous Medications[2]  PRN medications  PRN Medications[3]  Results from last 7 days   Lab Units 08/10/25  0726 08/09/25  0231 08/08/25  1453   WBC AUTO x10*3/uL 6.2 7.7 8.5   RBC AUTO x10*6/uL 4.73 4.09 4.58   HEMOGLOBIN g/dL 13.9 12.3 13.8     Results from last 7 days   Lab Units 08/10/25  0658 08/09/25  0231 08/09/25  0025 08/08/25  1453   SODIUM mmol/L 135* 136 137 127*   POTASSIUM mmol/L 3.8 3.7 3.4* " 3.9   CHLORIDE mmol/L 101 107 105 94*   CO2 mmol/L 25 21 22 19*   BUN mg/dL 11 10 11 15   CREATININE mg/dL 0.63 0.59 0.66 0.97   CALCIUM mg/dL 9.0 8.3* 8.5* 9.1   PHOSPHORUS mg/dL 3.7 2.8  --   --    MAGNESIUM mg/dL  --  1.82  --   --    BILIRUBIN TOTAL mg/dL  --   --   --  0.5   ALT U/L  --   --   --  13   AST U/L  --   --   --  13       Imaging  CT pelvis w IV contrast  Result Date: 8/8/2025  There is inflammatory stranding along the medial gluteal fold bilaterally. There is a 0.9 x 1.8 cm low attenuating focus along the right medial gluteal fold just inferior to the anorectum. There is a subtle 1.0 x 1.7 cm hypodense focus along the left labia. These findings are concerning for cellulitis and phlegmonous change/early developing abscess. No locules of free air are noted in the soft tissues of the groin.   Mildly enlarged bilateral inguinal lymph nodes are likely reactive.   Additional findings as noted above.   MACRO: None   Signed by: Jose Fang 8/8/2025 5:43 PM Dictation workstation:   [id number]      Cardiology, Vascular, and Other Imaging  No other imaging results found for the past 2 days       Assessment/Plan   Assessment & Plan  Diabetic ketoacidosis without coma associated with type 2 diabetes mellitus    Ms. Nia Helms is a 60 y.o. female w/ hx of uncontrolled non-compliant T2DM (A1c 16) and recurrent rectal/labial abscesses presented w/ fever and nausea with labial/rectal abscesses admitted with mild DKA that has now resolved; requiring IV antibiotics and multiple consultants.     #Mild DKA (resolved)  #Uncontrolled non-compliant T2DM (A1c 16%)  -GAP 14 with elevated beta hydroxybutyrate and severe hyperglycemia  -GAP has since closed following insulin and fluid administration  -Lantus 30 nightly, Lispro 10units TID, ISS#4 w/ meals and #1 at bedtime  -Reglan prn for n/v w/ suspected gastroparesis  -Endocrinology consulted, recs appreciated  -To continue follow up with Dr. Mayfield on discharge    -Psychiatry consulted, recs appreciated  -BS ADALGISA  -Diabetes educator    #Recurrent Perirectal and Labial abscess w/ surrounding cellulitis  -General Surgery consulted s/p aspiration of R perirectal abscess  -OB/GYN consulted, recs appreciated  -Wound culture pending from labial abscess  -1/4 Gram+ bacteremia blood cultures, likely contaminate  -Continue Zosyn/Vanc, plan to transition to PO tomorrow if continues to improve    #Pseudohyponatremia 2/2 hyperglycemia  -Resolved after correction of sugars      Global Plan of Care  IVF:  Diet: Diabetic   DVT prophylaxis: lovenox  Dispo: Remain in hospital for 1-2 days  Consults: endo, psych, gen surgey, obgyn  Code Status: DNR/DNI      Patient seen and discussed with the attending.  To be staffed with attending.  Signature: Felipe Wilcox, DO  Internal Medicine PGY-3 Resident             [1] fluconazole, 150 mg, oral, Daily  insulin glargine, 10 Units, subcutaneous, Once  insulin glargine, 30 Units, subcutaneous, Nightly  insulin lispro, 0-10 Units, subcutaneous, Nightly  insulin lispro, 0-20 Units, subcutaneous, TID AC  insulin lispro, 10 Units, subcutaneous, TID AC  piperacillin-tazobactam, 3.375 g, intravenous, q8h  polyethylene glycol, 17 g, oral, Daily  vancomycin, 1,250 mg, intravenous, q12h     [2]    [3] PRN medications: acetaminophen, dextrose, dextrose, glucagon, glucagon, metoclopramide, vancomycin
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
## 2025-08-10 NOTE — CARE PLAN
EOS Note Pt has remained HDS throughout shift. She remains AO x 4, room air and independent. Pt had a consult with psych today and gen surgery for recurring abscess. Pt did have cultures showing MRSA+ and precautions are in place. Pt is resting comfortably in bed, alarms on and call light within reach.      The patient's goals for the shift include      The clinical goals for the shift include see plan of care

## 2025-08-10 NOTE — SIGNIFICANT EVENT
Informed the patient's IV infiltrated while vancomycin was running.  Went up to assess patient, left hand with some swelling, patient strength, movement and sensation is intact, is able to move all of her fingers.  There is no erythema.  She is endorsing some itching.  Has an ice pack on right now.  We can do hot compress 20 to 30 minutes at a time every 4 hours to help with infiltration.  Will defer hyaluronidase at this time as it is minimal extravasation.    Patient also noted to be on contact precautions, I reviewed her chart and it appears that her 1 blood culture came back with gram-positive cocci however this is very likely contaminant as the other culture is negative.  She does not need to be on precautions for staph at this time.  Recommended nurse discontinue precautions.

## 2025-08-11 LAB
ALBUMIN SERPL BCP-MCNC: 3.2 G/DL (ref 3.4–5)
ANION GAP SERPL CALC-SCNC: 11 MMOL/L (ref 10–20)
BUN SERPL-MCNC: 13 MG/DL (ref 6–23)
CALCIUM SERPL-MCNC: 8.7 MG/DL (ref 8.6–10.3)
CHLORIDE SERPL-SCNC: 102 MMOL/L (ref 98–107)
CK SERPL-CCNC: 24 U/L (ref 0–215)
CO2 SERPL-SCNC: 27 MMOL/L (ref 21–32)
CREAT SERPL-MCNC: 0.59 MG/DL (ref 0.5–1.05)
EGFRCR SERPLBLD CKD-EPI 2021: >90 ML/MIN/1.73M*2
ERYTHROCYTE [DISTWIDTH] IN BLOOD BY AUTOMATED COUNT: 12.2 % (ref 11.5–14.5)
FOLATE SERPL-MCNC: 9.1 NG/ML
GLUCOSE BLD MANUAL STRIP-MCNC: 132 MG/DL (ref 74–99)
GLUCOSE BLD MANUAL STRIP-MCNC: 215 MG/DL (ref 74–99)
GLUCOSE BLD MANUAL STRIP-MCNC: 328 MG/DL (ref 74–99)
GLUCOSE BLD MANUAL STRIP-MCNC: 93 MG/DL (ref 74–99)
GLUCOSE SERPL-MCNC: 320 MG/DL (ref 74–99)
HCT VFR BLD AUTO: 35.8 % (ref 36–46)
HGB BLD-MCNC: 12.4 G/DL (ref 12–16)
HOLD SPECIMEN: NORMAL
HOLD SPECIMEN: NORMAL
MCH RBC QN AUTO: 30.3 PG (ref 26–34)
MCHC RBC AUTO-ENTMCNC: 34.6 G/DL (ref 32–36)
MCV RBC AUTO: 88 FL (ref 80–100)
NRBC BLD-RTO: 0 /100 WBCS (ref 0–0)
PHOSPHATE SERPL-MCNC: 4.5 MG/DL (ref 2.5–4.9)
PLATELET # BLD AUTO: 194 X10*3/UL (ref 150–450)
POTASSIUM SERPL-SCNC: 3.6 MMOL/L (ref 3.5–5.3)
RBC # BLD AUTO: 4.09 X10*6/UL (ref 4–5.2)
SODIUM SERPL-SCNC: 136 MMOL/L (ref 136–145)
VIT B12 SERPL-MCNC: 367 PG/ML (ref 211–911)
WBC # BLD AUTO: 5.2 X10*3/UL (ref 4.4–11.3)

## 2025-08-11 PROCEDURE — 82947 ASSAY GLUCOSE BLOOD QUANT: CPT

## 2025-08-11 PROCEDURE — 82550 ASSAY OF CK (CPK): CPT

## 2025-08-11 PROCEDURE — 2500000001 HC RX 250 WO HCPCS SELF ADMINISTERED DRUGS (ALT 637 FOR MEDICARE OP)

## 2025-08-11 PROCEDURE — 85027 COMPLETE CBC AUTOMATED: CPT

## 2025-08-11 PROCEDURE — 99232 SBSQ HOSP IP/OBS MODERATE 35: CPT

## 2025-08-11 PROCEDURE — 2500000004 HC RX 250 GENERAL PHARMACY W/ HCPCS (ALT 636 FOR OP/ED)

## 2025-08-11 PROCEDURE — 36415 COLL VENOUS BLD VENIPUNCTURE: CPT

## 2025-08-11 PROCEDURE — 2500000002 HC RX 250 W HCPCS SELF ADMINISTERED DRUGS (ALT 637 FOR MEDICARE OP, ALT 636 FOR OP/ED)

## 2025-08-11 PROCEDURE — 2500000002 HC RX 250 W HCPCS SELF ADMINISTERED DRUGS (ALT 637 FOR MEDICARE OP, ALT 636 FOR OP/ED): Performed by: INTERNAL MEDICINE

## 2025-08-11 PROCEDURE — 1100000001 HC PRIVATE ROOM DAILY

## 2025-08-11 PROCEDURE — 80069 RENAL FUNCTION PANEL: CPT

## 2025-08-11 RX ORDER — PEN NEEDLE, DIABETIC 31 GX5/16"
1 NEEDLE, DISPOSABLE MISCELLANEOUS 4 TIMES DAILY
Qty: 100 EACH | Refills: 0 | Status: SHIPPED | OUTPATIENT
Start: 2025-08-11 | End: 2025-09-10

## 2025-08-11 RX ORDER — CALCIUM CITRATE/VITAMIN D3 200MG-6.25
1 TABLET ORAL EVERY 6 HOURS PRN
Qty: 100 EACH | Refills: 1 | Status: SHIPPED | OUTPATIENT
Start: 2025-08-11 | End: 2025-09-11

## 2025-08-11 RX ORDER — INSULIN LISPRO 100 [IU]/ML
15 INJECTION, SOLUTION INTRAVENOUS; SUBCUTANEOUS
Status: DISCONTINUED | OUTPATIENT
Start: 2025-08-11 | End: 2025-08-12

## 2025-08-11 RX ORDER — INSULIN GLARGINE-YFGN 100 [IU]/ML
30 INJECTION, SOLUTION SUBCUTANEOUS NIGHTLY
Status: DISCONTINUED | OUTPATIENT
Start: 2025-08-11 | End: 2025-08-12

## 2025-08-11 RX ORDER — DEXTROSE 4 G
1 TABLET,CHEWABLE ORAL AS NEEDED
Qty: 1 EACH | Refills: 0 | Status: SHIPPED | OUTPATIENT
Start: 2025-08-11 | End: 2025-09-11

## 2025-08-11 RX ORDER — SULFAMETHOXAZOLE AND TRIMETHOPRIM 800; 160 MG/1; MG/1
1 TABLET ORAL EVERY 12 HOURS SCHEDULED
Status: DISCONTINUED | OUTPATIENT
Start: 2025-08-11 | End: 2025-08-12 | Stop reason: HOSPADM

## 2025-08-11 RX ORDER — INSULIN GLARGINE-YFGN 100 [IU]/ML
30 INJECTION, SOLUTION SUBCUTANEOUS NIGHTLY
Qty: 15 ML | Refills: 0 | Status: SHIPPED | OUTPATIENT
Start: 2025-08-11 | End: 2025-08-12

## 2025-08-11 RX ORDER — INSULIN ASPART 100 [IU]/ML
15 INJECTION, SOLUTION INTRAVENOUS; SUBCUTANEOUS
Qty: 15 ML | Refills: 0 | Status: SHIPPED | OUTPATIENT
Start: 2025-08-12 | End: 2025-08-12

## 2025-08-11 RX ADMIN — PIPERACILLIN SODIUM AND TAZOBACTAM SODIUM 3.38 G: 3; .375 INJECTION, SOLUTION INTRAVENOUS at 08:14

## 2025-08-11 RX ADMIN — INSULIN GLARGINE-YFGN 30 UNITS: 100 INJECTION, SOLUTION SUBCUTANEOUS at 21:23

## 2025-08-11 RX ADMIN — INSULIN LISPRO 15 UNITS: 100 INJECTION, SOLUTION INTRAVENOUS; SUBCUTANEOUS at 17:40

## 2025-08-11 RX ADMIN — SULFAMETHOXAZOLE AND TRIMETHOPRIM 1 TABLET: 800; 160 TABLET ORAL at 21:23

## 2025-08-11 RX ADMIN — INSULIN LISPRO 15 UNITS: 100 INJECTION, SOLUTION INTRAVENOUS; SUBCUTANEOUS at 08:34

## 2025-08-11 RX ADMIN — ACETAMINOPHEN 975 MG: 325 TABLET ORAL at 21:23

## 2025-08-11 RX ADMIN — INSULIN LISPRO 15 UNITS: 100 INJECTION, SOLUTION INTRAVENOUS; SUBCUTANEOUS at 13:09

## 2025-08-11 RX ADMIN — FLUCONAZOLE 150 MG: 150 TABLET ORAL at 09:23

## 2025-08-11 RX ADMIN — SULFAMETHOXAZOLE AND TRIMETHOPRIM 1 TABLET: 800; 160 TABLET ORAL at 11:30

## 2025-08-11 RX ADMIN — INSULIN LISPRO 8 UNITS: 100 INJECTION, SOLUTION INTRAVENOUS; SUBCUTANEOUS at 12:46

## 2025-08-11 RX ADMIN — INSULIN LISPRO 8 UNITS: 100 INJECTION, SOLUTION INTRAVENOUS; SUBCUTANEOUS at 08:34

## 2025-08-11 RX ADMIN — TRAZODONE HYDROCHLORIDE 50 MG: 50 TABLET ORAL at 21:23

## 2025-08-11 SDOH — ECONOMIC STABILITY: FOOD INSECURITY: HOW HARD IS IT FOR YOU TO PAY FOR THE VERY BASICS LIKE FOOD, HOUSING, MEDICAL CARE, AND HEATING?: SOMEWHAT HARD

## 2025-08-11 SDOH — ECONOMIC STABILITY: HOUSING INSECURITY: IN THE LAST 12 MONTHS, WAS THERE A TIME WHEN YOU WERE NOT ABLE TO PAY THE MORTGAGE OR RENT ON TIME?: NO

## 2025-08-11 SDOH — SOCIAL STABILITY: SOCIAL INSECURITY
WITHIN THE LAST YEAR, HAVE YOU BEEN KICKED, HIT, SLAPPED, OR OTHERWISE PHYSICALLY HURT BY YOUR PARTNER OR EX-PARTNER?: NO

## 2025-08-11 SDOH — ECONOMIC STABILITY: FOOD INSECURITY
WITHIN THE PAST 12 MONTHS, YOU WORRIED THAT YOUR FOOD WOULD RUN OUT BEFORE YOU GOT THE MONEY TO BUY MORE.: SOMETIMES TRUE

## 2025-08-11 SDOH — SOCIAL STABILITY: SOCIAL INSECURITY: WITHIN THE LAST YEAR, HAVE YOU BEEN AFRAID OF YOUR PARTNER OR EX-PARTNER?: NO

## 2025-08-11 SDOH — SOCIAL STABILITY: SOCIAL INSECURITY
WITHIN THE LAST YEAR, HAVE YOU BEEN RAPED OR FORCED TO HAVE ANY KIND OF SEXUAL ACTIVITY BY YOUR PARTNER OR EX-PARTNER?: NO

## 2025-08-11 SDOH — ECONOMIC STABILITY: HOUSING INSECURITY: AT ANY TIME IN THE PAST 12 MONTHS, WERE YOU HOMELESS OR LIVING IN A SHELTER (INCLUDING NOW)?: NO

## 2025-08-11 SDOH — ECONOMIC STABILITY: FOOD INSECURITY: WITHIN THE PAST 12 MONTHS, THE FOOD YOU BOUGHT JUST DIDN'T LAST AND YOU DIDN'T HAVE MONEY TO GET MORE.: SOMETIMES TRUE

## 2025-08-11 SDOH — SOCIAL STABILITY: SOCIAL INSECURITY: ARE THERE ANY APPARENT SIGNS OF INJURIES/BEHAVIORS THAT COULD BE RELATED TO ABUSE/NEGLECT?: NO

## 2025-08-11 SDOH — ECONOMIC STABILITY: HOUSING INSECURITY: IN THE PAST 12 MONTHS, HOW MANY TIMES HAVE YOU MOVED WHERE YOU WERE LIVING?: 0

## 2025-08-11 SDOH — ECONOMIC STABILITY: INCOME INSECURITY: IN THE PAST 12 MONTHS HAS THE ELECTRIC, GAS, OIL, OR WATER COMPANY THREATENED TO SHUT OFF SERVICES IN YOUR HOME?: NO

## 2025-08-11 SDOH — SOCIAL STABILITY: SOCIAL INSECURITY: HAVE YOU HAD THOUGHTS OF HARMING ANYONE ELSE?: NO

## 2025-08-11 SDOH — SOCIAL STABILITY: SOCIAL INSECURITY: ABUSE: ADULT

## 2025-08-11 SDOH — ECONOMIC STABILITY: TRANSPORTATION INSECURITY: IN THE PAST 12 MONTHS, HAS LACK OF TRANSPORTATION KEPT YOU FROM MEDICAL APPOINTMENTS OR FROM GETTING MEDICATIONS?: NO

## 2025-08-11 SDOH — SOCIAL STABILITY: SOCIAL INSECURITY: HAS ANYONE EVER THREATENED TO HURT YOUR FAMILY OR YOUR PETS?: NO

## 2025-08-11 SDOH — SOCIAL STABILITY: SOCIAL INSECURITY: WITHIN THE LAST YEAR, HAVE YOU BEEN HUMILIATED OR EMOTIONALLY ABUSED IN OTHER WAYS BY YOUR PARTNER OR EX-PARTNER?: NO

## 2025-08-11 SDOH — SOCIAL STABILITY: SOCIAL INSECURITY: WERE YOU ABLE TO COMPLETE ALL THE BEHAVIORAL HEALTH SCREENINGS?: YES

## 2025-08-11 SDOH — SOCIAL STABILITY: SOCIAL INSECURITY: ARE YOU OR HAVE YOU BEEN THREATENED OR ABUSED PHYSICALLY, EMOTIONALLY, OR SEXUALLY BY ANYONE?: NO

## 2025-08-11 SDOH — SOCIAL STABILITY: SOCIAL INSECURITY: DO YOU FEEL ANYONE HAS EXPLOITED OR TAKEN ADVANTAGE OF YOU FINANCIALLY OR OF YOUR PERSONAL PROPERTY?: NO

## 2025-08-11 SDOH — SOCIAL STABILITY: SOCIAL INSECURITY: DO YOU FEEL UNSAFE GOING BACK TO THE PLACE WHERE YOU ARE LIVING?: NO

## 2025-08-11 SDOH — SOCIAL STABILITY: SOCIAL INSECURITY: DOES ANYONE TRY TO KEEP YOU FROM HAVING/CONTACTING OTHER FRIENDS OR DOING THINGS OUTSIDE YOUR HOME?: NO

## 2025-08-11 ASSESSMENT — COGNITIVE AND FUNCTIONAL STATUS - GENERAL
PATIENT BASELINE BEDBOUND: NO
MOBILITY SCORE: 24
DAILY ACTIVITIY SCORE: 24

## 2025-08-11 ASSESSMENT — PAIN SCALES - GENERAL
PAINLEVEL_OUTOF10: 0 - NO PAIN
PAINLEVEL_OUTOF10: 3

## 2025-08-11 ASSESSMENT — PAIN - FUNCTIONAL ASSESSMENT
PAIN_FUNCTIONAL_ASSESSMENT: 0-10

## 2025-08-11 ASSESSMENT — LIFESTYLE VARIABLES
HOW OFTEN DO YOU HAVE A DRINK CONTAINING ALCOHOL: PATIENT DECLINED
AUDIT-C TOTAL SCORE: -1
AUDIT-C TOTAL SCORE: -1
HOW OFTEN DO YOU HAVE 6 OR MORE DRINKS ON ONE OCCASION: PATIENT DECLINED
HOW MANY STANDARD DRINKS CONTAINING ALCOHOL DO YOU HAVE ON A TYPICAL DAY: PATIENT DECLINED
SKIP TO QUESTIONS 9-10: 0

## 2025-08-11 ASSESSMENT — ACTIVITIES OF DAILY LIVING (ADL)
LACK_OF_TRANSPORTATION: NO
LACK_OF_TRANSPORTATION: NO

## 2025-08-11 ASSESSMENT — PATIENT HEALTH QUESTIONNAIRE - PHQ9
2. FEELING DOWN, DEPRESSED OR HOPELESS: NOT AT ALL
SUM OF ALL RESPONSES TO PHQ9 QUESTIONS 1 & 2: 0
1. LITTLE INTEREST OR PLEASURE IN DOING THINGS: NOT AT ALL

## 2025-08-11 NOTE — PROGRESS NOTES
Vancomycin Dosing by Pharmacy- Cessation of Therapy    Consult to pharmacy for vancomycin dosing has been discontinued by the prescriber, pharmacy will sign off at this time.    Please call pharmacy if there are further questions or re-enter a consult if vancomycin is resumed.     Zachary Cadet, PharmD

## 2025-08-11 NOTE — PROGRESS NOTES
"ID:  Nia Helms is a 60 y.o. female on hospital day 3 of admission presenting with Diabetic ketoacidosis without coma associated with type 2 diabetes mellitus.    Subjective   The patient seen and examined this morning at bedside. No overnight events. The patient denied chest pain, fever, chills, abdominal pain, nausea, vomiting, and no shortness of breath.  Patient did report some suprapubic pain and enuresis more than usual.  She also mentioned having some dysuria.  Regards having 3-4 episodes of not watery diarrhea to which she attributed to antibiotics.  Otherwise patient is stable and is tolerating her diabetic diet.    Objective     Visit Vitals  /81 (BP Location: Right arm, Patient Position: Lying)   Pulse 74   Temp 36 °C (96.8 °F) (Temporal)   Resp 16   Ht 1.676 m (5' 6\")   Wt 68.5 kg (151 lb)   SpO2 99%   BMI 24.37 kg/m²   Smoking Status Every Day   BSA 1.79 m²        Physical Examination:  General: Appears stated age, well nourished, A&Ox4, conversational, lying comfortably in bed, not in pain or distress. On RA.  HEENT: Mucous membranes moist.  Head/Neck: Atraumatic, Normocephalic. Neck supple.   Respiratory: equal air entry bilaterally no crackles or wheezing were appreciated.  Cardiovascular: regular rhythm, normal S1 and S2. No added sounds or murmurs,  Gastrointestinal: soft, non-tender abdomen, bowel sounds present, no guarding or rebound.   Genitourinary: Suprapubic tenderness.  Extremities: No edema in lower extremities.  Neurological:  no focal neurologic deficits.  Psychological: Appropriate mood, normal affect.      Laboratory Data:    Results from last 7 days   Lab Units 08/10/25  0726 08/09/25  0231 08/08/25  1453   WBC AUTO x10*3/uL 6.2 7.7 8.5   RBC AUTO x10*6/uL 4.73 4.09 4.58   HEMOGLOBIN g/dL 13.9 12.3 13.8     Results from last 7 days   Lab Units 08/10/25  0658 08/09/25  0231 08/09/25  0025 08/08/25  1453   SODIUM mmol/L 135* 136 137 127*   POTASSIUM mmol/L 3.8 3.7 3.4* 3.9 "   CHLORIDE mmol/L 101 107 105 94*   CO2 mmol/L 25 21 22 19*   BUN mg/dL 11 10 11 15   CREATININE mg/dL 0.63 0.59 0.66 0.97   CALCIUM mg/dL 9.0 8.3* 8.5* 9.1   PHOSPHORUS mg/dL 3.7 2.8  --   --    MAGNESIUM mg/dL  --  1.82  --   --    BILIRUBIN TOTAL mg/dL  --   --   --  0.5   ALT U/L  --   --   --  13   AST U/L  --   --   --  13       Imaging:  Imaging  No results found.    Cardiology, Vascular, and Other Imaging  No other imaging results found for the past 2 days       Medications:  Scheduled medications  Scheduled Medications[1]  Continuous medications  Continuous Medications[2]  PRN medications  PRN Medications[3]    Assessment    Assessment & Plan   Ms. Nia Helms is a 60 y.o. female who presents with hx of uncontrolled non-compliant T2DM (A1c 16) and recurrent rectal/labial abscesses presented w/ fever and nausea with labial/rectal abscesses admitted with mild DKA that has now resolved; requiring IV antibiotics and multiple consultants.  No longer on IV antibiotics, added a 10-day course of Bactrim due to positive urine culture and suprapubic pain.    #Mild DKA (resolved)  #Uncontrolled non-compliant T2DM (A1c 16%)  -GAP 14 with elevated beta hydroxybutyrate and severe hyperglycemia.  -GAP has since closed following insulin and fluid administration.  -Lantus 30 nightly, Lispro 10units TID, ISS#4 w/ meals and #1 at bedtime.  -Hypoglycemia protocol ordered.  -Reglan prn for n/v w/ suspected gastroparesis.  -Will ensure patient has enough supplies on discharge for her insulin regimen.  -To continue follow up with Dr. Mayfield on discharge .  -To continue follow-up with Brianna Reynolds for primary care.  -Psychiatry consulted, recs appreciated.  -ROBIN ACHS.  -Diabetes educator.     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  -Urine culture collected on 8/8 showed growth for Streptococcus agalactiae.  -Wound culture from labial abscess shows no growth.  -Blood culture  done 8/10/2025 at 2002 shows no growth at 2 days.  -1/4 Gram+ bacteremia blood cultures, likely contaminate.     Plan  -Patient was placed on a 10-day course of Bactrim for her positive urine culture and suprapubic pain.  - Will monitor the patient to assess for any side effects.    #Pseudohyponatremia 2/2 hyperglycemia  -Resolved after correction of sugars    # Neuropathy  -Regards having lower extremity neuropathy that has becoming more noticeable than before.  -No complete paresthesia, however she regards having a mild decrease sensation in lower extremities.  -Patient was educated that this is likely to the diabetes.    Plan  - Ordered B12, B6, folate, CK labs, pending results.    Global Plan of Care  Fluid: PRN  Electrolytes: PRN  Nutrition: Diabetic   DVT Prophylaxis: Lovenox  GI Prophylaxis: None  Consults: endo, psych, gen surgey, obgyn  Code Status: DNR and No Intubation     Emergency Contact: Extended Emergency Contact Information  Primary Emergency Contact: Mimi Auguste  Home Phone: [phone]  Relation: Sibling     Patient seen and discussed with the attending.  Signature: Med Ballesteros DO, PGY I Internal medicine  Date: August 11, 2025   This note has been transcribed using Dragon voice recognition system and there is a possibility of unintentional typing misprints.  Any information found to be copied from previous providers is done in the best interest of the patient to provide accurate, quality, and continuity of care.           [1] fluconazole, 150 mg, oral, Daily  insulin glargine, 10 Units, subcutaneous, Once  insulin glargine, 30 Units, subcutaneous, Nightly  insulin lispro, 0-10 Units, subcutaneous, Nightly  insulin lispro, 0-20 Units, subcutaneous, TID AC  insulin lispro, 10 Units, subcutaneous, TID AC  piperacillin-tazobactam, 3.375 g, intravenous, q8h  polyethylene glycol, 17 g, oral, Daily  traZODone, 50 mg, oral, Nightly  vancomycin, 1,250 mg, intravenous, q12h  [2]    [3] PRN medications:  acetaminophen, dextrose, dextrose, glucagon, glucagon, metoclopramide, vancomycin

## 2025-08-11 NOTE — CONSULTS
"Inpatient Diabetes Education Consult    Reason for Visit:  Nia Helms is a 60 y.o. female who presents for DKA. Also with labial and perirectal abscess.     Consulting Service/Provider: Dr. Driver     Visit Type: Initial visit    Visit Modality: In-person    Discharge Equipment/Supply Needs: might need prescription for glucometer, see note. Will need prescriptions for insulin pens and pen needles.         Patient has supplies at home: Blood glucose meter:      Patient History and Assessment:    Previous diagnosis: Type 2  Patient known to Diabetes Education department: No  Treatment prior to hospital admission: had stopped her basal/bolus regimen  Complications: neuropathy, gastroparesis  PTA Medications:  No current outpatient medications    Glucose   Date/Time Value Ref Range Status   08/11/2025 06:05  (H) 74 - 99 mg/dL Final   08/10/2025 06:58  (H) 74 - 99 mg/dL Final   08/09/2025 02:31  (H) 74 - 99 mg/dL Final   08/09/2025 12:25  (H) 74 - 99 mg/dL Final   08/08/2025 02:53  (HH) 74 - 99 mg/dL Final   06/15/2020 12:39  (H) 74 - 99 mg/dL Final   06/09/2020 05:44  (H) 74 - 99 mg/dL Final   06/08/2020 07:49  (H) 74 - 99 mg/dL Final   06/07/2020 03:49  (H) 74 - 99 mg/dL Final   06/07/2020 08:08  (H) 74 - 99 mg/dL Final   06/06/2020 09:31  (H) 74 - 99 mg/dL Final   06/05/2020 09:21  (H) 74 - 99 mg/dL Final   06/04/2020 06:41  (H) 74 - 99 mg/dL Final   06/02/2020 09:38  (H) 74 - 99 mg/dL Final   06/01/2020 05:48  (H) 74 - 99 mg/dL Final     No results found for: \"CPEPTIDE\"  Hemoglobin A1C   Date Value Ref Range Status   08/08/2025 16.7 (H) See comment % Final     Comment:     Hemoglobin A1c is >15%. Marked elevations in HbA1c are commonly due to poor glycemic control in diabetic patients. Rarely, hemoglobin variants may cause false elevation of HbA1c that is discordant with glycemic control status.    04/10/2025 16.4 (H) 4.3 - 5.6 " % Final     Comment:     The HbA1c value is greater than 15%. Interpret this hemoglobin A1c result within the patients clinical context. Consider the possibility of a hemoglobin variant interference. Consider ordering Fructosamine as an alternate measurement of glycemic control.  If identification of a previously unidentified hemoglobin variant is clinically indicated, consider ordering the hemoglobin evaluation cascade test.  American Diabetes Association guidelines indicate that patients with HgbA1c in the range 5.7-6.4% are at increased risk for development of diabetes, and intervention by lifestyle modification may be beneficial. HgbA1c greater or equal to 6.5% is considered diagnostic of diabetes.   03/08/2024 8.4 (H) 4.3 - 5.6 % Final     Comment:     American Diabetes Association guidelines indicate that patients with HgbA1c in the range 5.7-6.4% are at increased risk for development of diabetes, and intervention by lifestyle modification may be beneficial. HgbA1c greater or equal to 6.5% is considered diagnostic of diabetes.   12/22/2023 16.3 (H) 4.3 - 5.6 % Final     Comment:     The HbA1c value is greater than 15%. Interpret this hemoglobin A1c result within the patients clinical context. Consider the possibility of a hemoglobin variant interference. Consider ordering Fructosamine as an alternate measurement of glycemic control.  If identification of a previously unidentified hemoglobin variant is clinically indicated, consider ordering the hemoglobin evaluation cascade test.  American Diabetes Association guidelines indicate that patients with HgbA1c in the range 5.7-6.4% are at increased risk for development of diabetes, and intervention by lifestyle modification may be beneficial. HgbA1c greater or equal to 6.5% is considered diagnostic of diabetes.   02/09/2023 14.8 (H) 4.0 - 5.6 % Final       Patient Learning/Readiness Assessment:  Acceptance    Interventions/Topics Covered:  See After Visit Summary  "for handouts/information sheets provided to patient.  Education Documentation  No documentation found.        Additional topics covered: Hypoglycemia management.   Additional materials provided:  DM patient guide, 2 handouts on exercise, blood glucose log sheet, A1C handout with patient's level written along with target level, hypo/hyperglycemia signs/symptoms & treatment for hypoglycemia.          Education Outcome/Recommendations:        Recommendations for bedside nursing: continue educating on the importance of managing diabetes including taking insulin and checking blood sugar.    Recommendations for Providers: addressed in note and supplies needed above.     Additional Comments: Patient admits she's under a lot of stress lately and finances are part of it. She knew it was time to come into hospital when her labial and perirectal abscess started draining. tends to smoke \"intermittently\" when stressed and drinks coke when smoking. Says being in hospital she knows she can quit the smoking, however also says she has no plan to deal with stress in healthy way. She had psych eval for concerns of depression vs DM related eating disorder as she was not taking insulin. She did not like the weight gain that came with taking insulin. She's lost weight since stopping insulin and likes the weight loss. Says she feels better with less weight. Patient complained the insulin she was taking at home wasn't brining her blood sugars down. When she stopped the insulin she mentioned blood sugars ranged 250-400. She didn't think that was too high. Said it went even higher when taking insulin. Endocrinologist said she does require remarkable amount of insulin given her size and the insulin isn't brining down her sugars. Of note, she has adrenal nodule from previous imagining. He said she doesn't have hypercortisolism, but if she did that would explain her insulin resistance. She needs further outpatient testing.     Per psych eval " "pt decision to discontinue insulin was not related to psychiatric issues or eating disorders, but rather due to perceived ineffectiveness and weight gain concerns. No depression or anxiety beyond normal levels.   Patient said \"I don't have a death wish,\" but my dad had DM and  quick of heart attack at 61. Many in my family have  around that age. I don't mind if that's how I go.\" Discussed health consequences that can occur if DM left uncontrolled. She does not want to experience those.     She admits her work schedule is busy. 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 Said it will be difficult to stop and check blood sugar and give insulin. \"I run-run-run when I'm working.\" She pointed to soft cooler in her room. Said she could see herself giving self insulin in AM before leaving for work and at night when done. Educated pt that she is operating a motor vehicle and from safety standpoint having a plan in place to check her blood sugars and take insulin is important if she wants to be productive and have less health problems. Thought she might benefit from CGM. She tried one in past & didn't like it. After conversation she said she would try it again if insurance covers it. I asked pharmacy to check and CGM is not covered. She has [employer] meter from [employer] from supplies. Admits her test strips are likely  after I asked if bottle was opened more than 90 days ago. She is fine to keep using meter, but willing to have pharmacy check to if insurance covers cost of one. Pharmacy responded after I saw her and [employer] with supplies would be covered at $10.00.     She injects insulin in her abdomen. I palpated it and did not see or feel and lipodystrophy. She tends to inject below to belly button. Advised she keep 2 inches away from belly button all the way around. Reviewed where in abdomen, arms and thighs she can inject.   She could teach back most steps for insulin pen set up. Needed education on " "priming before each injection and holding pen in place 10 seconds after dose is delivered.   She is familiar with insulin regimen of giving fast acting insulin at meals, adding sliding scale to set meal time dose based on blood sugar and once daily long acting insulin. Reviewed action and proper timing of the insulins. We discussed that will likely be what she goes home on. Discussed how to keep insulin cool enough when in her car for 12 hr.     Discussed her fasting blood sugar being high this AM due to snacking after dinner yesterday. \"I am a middle of the night grazer.\" She might eat cereal or pasta. Didn't say she does this regularly. Shared what Dr. Mayfield said that she may need sliding scale with bedtime snack given her blood sugar response. She does have history of gastroparesis. She did not report any issues with eating or discomfort with food in general. She would benefit from RD consult.      Suggested she carry glucose tablets with her to treat low blood sugar instead of regular pop because \"I love the taste of coke\" and it is tempting for her to drink during normal circumstances.   Will follow as needed.   "

## 2025-08-11 NOTE — PROGRESS NOTES
"    Endocrinology Inpatient Consult Progress Note     PATIENT NAME: Nia Helms  MRN: [id number]  DATE: 8/11/2025    CONSULTING PHYSICIAN: Dr. KATIE Driver  REASON FOR CONSULT: Uncontrolled T2DM.  Right Adrenal Nodule.      Interval Events     No acute events overnight.  The patient was eating breakfast this morning.    She states that she did have crackers, peanut butter, and Jell-O yesterday after dinner.  She denies any nausea or vomiting.  She is happy her sugars have improved.      Physical Examination     /81 (BP Location: Right arm, Patient Position: Lying)   Pulse 74   Temp 36 °C (96.8 °F) (Temporal)   Resp 16   Ht 1.676 m (5' 6\")   Wt 68.5 kg (151 lb)   SpO2 99%   BMI 24.37 kg/m²   No acute distress.  Appropriate affect.  Regular rate and rhythm.  Nonlabored respiration.  No pedal edema bilaterally.      Medications     Reviewed MAR       Data     Recent Labs and Imaging Reviewed      Assessment / Plan         # Uncontrolled Type 2 Diabetes Mellitus  Home Regimen: None.   Hemoglobin A1c (8/25): 16.7%  Nutrition: CHO Controlled Diet.     See my initial consult note for my clinical formulation.    Sugars greatly improved in the last 24 hours.  Severe fasting hyperglycemia.  Patient admittedly did have a lot of carb heavy items after dinner.  She was counseled that she likely will need to be taking short acting insulin if she does have a bedtime snack.  She understood.     - Continue 30 units nightly, I am not going to increase her basals this was not a true fasting blood sugar but rather her snacking at bedtime  - Increase lispro to 15 units with each meal  - Continue sliding scale #3 at mealtime and #2 at HS  - Diabetes education     # Right Adrenal Nodule: Reportedly was 1.2 cm in August 2020 at [facility].  This was 1.8 cm in December 2023 at [facility].  Finally this was found to be 2.3 cm on her most recent scan in April 2025 at [facility].  The radiologist called this a myelolipoma.  This was a enhanced " study, there has been no remarks about the attenuation of this nodule.  The CT of the pelvis done during this admission does not capture either adrenal gland.  She clinically does not appear to have hypercortisolism, but that would explain her insulin resistance.  This needs outpatient follow-up with a 1 mg overnight dexamethasone suppression test.     # Labial Abscess, Perirectal Abscess: I explained to the patient however hyperglycemia relates to these 2 issues.         Hemant Mayfiedl, DO  Endocrinology, Diabetes, and Metabolism    Available via EP[employer] Messenger    Please excuse any typographical or unwanted errors within this documentation as voice recognition software was used to dictate this note.

## 2025-08-11 NOTE — CARE PLAN
Problem: Pain - Adult  Goal: Verbalizes/displays adequate comfort level or baseline comfort level  Outcome: Met     Problem: Safety - Adult  Goal: Free from fall injury  Outcome: Met     Problem: Discharge Planning  Goal: Discharge to home or other facility with appropriate resources  Outcome: Progressing     Problem: Chronic Conditions and Co-morbidities  Goal: Patient's chronic conditions and co-morbidity symptoms are monitored and maintained or improved  Outcome: Progressing     Problem: Nutrition  Goal: Nutrient intake appropriate for maintaining nutritional needs  Outcome: Progressing     Problem: Diabetes  Goal: Achieve decreasing blood glucose levels by end of shift  Outcome: Progressing  Goal: Increase stability of blood glucose readings by end of shift  Outcome: Progressing  Goal: Maintain glucose levels >70mg/dl to <250mg/dl throughout shift  Outcome: Progressing  Goal: No changes in neurological exam by end of shift  Outcome: Met  Goal: Learn about and adhere to nutrition recommendations by end of shift  Outcome: Progressing  Goal: Vital signs within normal range for age by end of shift  Outcome: Progressing  Goal: Increase self care and/or family involovement by end of shift  Outcome: Progressing  Goal: Receive DSME education by end of shift  Outcome: Progressing     The patient's goals for the shift include sleep and comfort    The clinical goals for the shift include see POC

## 2025-08-11 NOTE — NURSING NOTE
EOS:  Pt denies pain.  Pt has + UTI with yeast. Discussed better meal choices to keep glucose down. Discussed smoking cessation, pt stated she can stop at any time. Diabetes educator met with pt.  Pt is A&O x 4, safety maintained, denies any needs and call light is within reach.

## 2025-08-11 NOTE — CARE PLAN
The patient's goals for the shift include sleep and comfort.     The clinical goals for the shift include safety and comfort.    Over the shift, the patient did not make progress toward the following goals. Barriers to progression include recurrent pain. Recommendations to address these barriers include pain medications as neeed.

## 2025-08-12 ENCOUNTER — PHARMACY VISIT (OUTPATIENT)
Dept: PHARMACY | Facility: CLINIC | Age: 60
End: 2025-08-12
Payer: COMMERCIAL

## 2025-08-12 VITALS
TEMPERATURE: 97 F | BODY MASS INDEX: 24.27 KG/M2 | HEART RATE: 98 BPM | WEIGHT: 151 LBS | DIASTOLIC BLOOD PRESSURE: 69 MMHG | SYSTOLIC BLOOD PRESSURE: 94 MMHG | RESPIRATION RATE: 17 BRPM | HEIGHT: 66 IN | OXYGEN SATURATION: 100 %

## 2025-08-12 LAB
ALBUMIN SERPL BCP-MCNC: 3.2 G/DL (ref 3.4–5)
ANION GAP SERPL CALC-SCNC: 13 MMOL/L (ref 10–20)
BACTERIA BLD CULT: NORMAL
BACTERIA SPEC CULT: NORMAL
BUN SERPL-MCNC: 13 MG/DL (ref 6–23)
CALCIUM SERPL-MCNC: 8.8 MG/DL (ref 8.6–10.3)
CHLORIDE SERPL-SCNC: 104 MMOL/L (ref 98–107)
CO2 SERPL-SCNC: 25 MMOL/L (ref 21–32)
CREAT SERPL-MCNC: 0.72 MG/DL (ref 0.5–1.05)
EGFRCR SERPLBLD CKD-EPI 2021: >90 ML/MIN/1.73M*2
ERYTHROCYTE [DISTWIDTH] IN BLOOD BY AUTOMATED COUNT: 12.3 % (ref 11.5–14.5)
GLUCOSE BLD MANUAL STRIP-MCNC: 264 MG/DL (ref 74–99)
GLUCOSE BLD MANUAL STRIP-MCNC: 285 MG/DL (ref 74–99)
GLUCOSE SERPL-MCNC: 256 MG/DL (ref 74–99)
GRAM STN SPEC: NORMAL
GRAM STN SPEC: NORMAL
HCT VFR BLD AUTO: 38.6 % (ref 36–46)
HGB BLD-MCNC: 13 G/DL (ref 12–16)
MCH RBC QN AUTO: 29.9 PG (ref 26–34)
MCHC RBC AUTO-ENTMCNC: 33.7 G/DL (ref 32–36)
MCV RBC AUTO: 89 FL (ref 80–100)
NRBC BLD-RTO: 0 /100 WBCS (ref 0–0)
PHOSPHATE SERPL-MCNC: 4.1 MG/DL (ref 2.5–4.9)
PLATELET # BLD AUTO: 216 X10*3/UL (ref 150–450)
POTASSIUM SERPL-SCNC: 3.8 MMOL/L (ref 3.5–5.3)
RBC # BLD AUTO: 4.35 X10*6/UL (ref 4–5.2)
SODIUM SERPL-SCNC: 138 MMOL/L (ref 136–145)
WBC # BLD AUTO: 4.3 X10*3/UL (ref 4.4–11.3)

## 2025-08-12 PROCEDURE — 80069 RENAL FUNCTION PANEL: CPT

## 2025-08-12 PROCEDURE — 85027 COMPLETE CBC AUTOMATED: CPT

## 2025-08-12 PROCEDURE — 2500000001 HC RX 250 WO HCPCS SELF ADMINISTERED DRUGS (ALT 637 FOR MEDICARE OP)

## 2025-08-12 PROCEDURE — 2500000002 HC RX 250 W HCPCS SELF ADMINISTERED DRUGS (ALT 637 FOR MEDICARE OP, ALT 636 FOR OP/ED): Performed by: INTERNAL MEDICINE

## 2025-08-12 PROCEDURE — 2500000002 HC RX 250 W HCPCS SELF ADMINISTERED DRUGS (ALT 637 FOR MEDICARE OP, ALT 636 FOR OP/ED)

## 2025-08-12 PROCEDURE — 84207 ASSAY OF VITAMIN B-6: CPT

## 2025-08-12 PROCEDURE — RXMED WILLOW AMBULATORY MEDICATION CHARGE

## 2025-08-12 PROCEDURE — 82947 ASSAY GLUCOSE BLOOD QUANT: CPT

## 2025-08-12 PROCEDURE — 36415 COLL VENOUS BLD VENIPUNCTURE: CPT

## 2025-08-12 PROCEDURE — 99239 HOSP IP/OBS DSCHRG MGMT >30: CPT

## 2025-08-12 RX ORDER — INSULIN LISPRO 100 [IU]/ML
0-20 INJECTION, SOLUTION INTRAVENOUS; SUBCUTANEOUS
Qty: 15 ML | Refills: 12 | Status: SHIPPED | OUTPATIENT
Start: 2025-08-12 | End: 2025-08-12 | Stop reason: SDUPTHER

## 2025-08-12 RX ORDER — INSULIN GLARGINE-YFGN 100 [IU]/ML
40 INJECTION, SOLUTION SUBCUTANEOUS NIGHTLY
Status: DISCONTINUED | OUTPATIENT
Start: 2025-08-12 | End: 2025-08-12 | Stop reason: HOSPADM

## 2025-08-12 RX ORDER — INSULIN LISPRO 100 [IU]/ML
18 INJECTION, SOLUTION INTRAVENOUS; SUBCUTANEOUS
Status: DISCONTINUED | OUTPATIENT
Start: 2025-08-12 | End: 2025-08-12 | Stop reason: HOSPADM

## 2025-08-12 RX ORDER — INSULIN LISPRO 100 [IU]/ML
0-10 INJECTION, SOLUTION INTRAVENOUS; SUBCUTANEOUS NIGHTLY
Qty: 10 ML | Refills: 12 | Status: CANCELLED | OUTPATIENT
Start: 2025-08-12 | End: 2026-08-12

## 2025-08-12 RX ORDER — INSULIN GLARGINE-YFGN 100 [IU]/ML
40 INJECTION, SOLUTION SUBCUTANEOUS NIGHTLY
Qty: 15 ML | Refills: 0 | Status: CANCELLED | OUTPATIENT
Start: 2025-08-12 | End: 2025-09-11

## 2025-08-12 RX ORDER — INSULIN ASPART 100 [IU]/ML
18 INJECTION, SOLUTION INTRAVENOUS; SUBCUTANEOUS
Qty: 15 ML | Refills: 0 | Status: CANCELLED | OUTPATIENT
Start: 2025-08-12 | End: 2025-09-11

## 2025-08-12 RX ORDER — LANCETS
EACH MISCELLANEOUS
Qty: 100 EACH | Refills: 0 | Status: SHIPPED | OUTPATIENT
Start: 2025-08-12

## 2025-08-12 RX ORDER — INSULIN GLARGINE-YFGN 100 [IU]/ML
40 INJECTION, SOLUTION SUBCUTANEOUS NIGHTLY
Qty: 15 ML | Refills: 0 | Status: SHIPPED | OUTPATIENT
Start: 2025-08-12 | End: 2025-09-18

## 2025-08-12 RX ORDER — FLUCONAZOLE 150 MG/1
150 TABLET ORAL DAILY
Qty: 3 TABLET | Refills: 0 | Status: CANCELLED | OUTPATIENT
Start: 2025-08-13 | End: 2025-08-16

## 2025-08-12 RX ORDER — SULFAMETHOXAZOLE AND TRIMETHOPRIM 800; 160 MG/1; MG/1
1 TABLET ORAL EVERY 12 HOURS SCHEDULED
Qty: 17 TABLET | Refills: 0 | Status: CANCELLED | OUTPATIENT
Start: 2025-08-12 | End: 2025-08-21

## 2025-08-12 RX ORDER — INSULIN ASPART 100 [IU]/ML
18 INJECTION, SOLUTION INTRAVENOUS; SUBCUTANEOUS
Qty: 30 ML | Refills: 0 | Status: SHIPPED | OUTPATIENT
Start: 2025-08-12 | End: 2025-09-11

## 2025-08-12 RX ORDER — FLUCONAZOLE 150 MG/1
150 TABLET ORAL DAILY
Qty: 3 TABLET | Refills: 0 | Status: SHIPPED | OUTPATIENT
Start: 2025-08-13 | End: 2025-08-16

## 2025-08-12 RX ORDER — INSULIN LISPRO 100 [IU]/ML
0-20 INJECTION, SOLUTION INTRAVENOUS; SUBCUTANEOUS
Qty: 10 ML | Refills: 12 | Status: CANCELLED | OUTPATIENT
Start: 2025-08-12 | End: 2026-08-12

## 2025-08-12 RX ORDER — ISOPROPYL ALCOHOL 70 ML/100ML
1 SWAB TOPICAL AS NEEDED
Qty: 100 EACH | Refills: 0 | Status: SHIPPED | OUTPATIENT
Start: 2025-08-12 | End: 2025-09-11

## 2025-08-12 RX ORDER — INSULIN LISPRO 100 [IU]/ML
0-20 INJECTION, SOLUTION INTRAVENOUS; SUBCUTANEOUS
Qty: 10 ML | Refills: 12 | Status: SHIPPED | OUTPATIENT
Start: 2025-08-12 | End: 2025-08-12

## 2025-08-12 RX ORDER — SULFAMETHOXAZOLE AND TRIMETHOPRIM 800; 160 MG/1; MG/1
1 TABLET ORAL EVERY 12 HOURS SCHEDULED
Qty: 17 TABLET | Refills: 0 | Status: SHIPPED | OUTPATIENT
Start: 2025-08-12 | End: 2025-08-21

## 2025-08-12 RX ADMIN — SULFAMETHOXAZOLE AND TRIMETHOPRIM 1 TABLET: 800; 160 TABLET ORAL at 09:38

## 2025-08-12 RX ADMIN — INSULIN LISPRO 18 UNITS: 100 INJECTION, SOLUTION INTRAVENOUS; SUBCUTANEOUS at 09:39

## 2025-08-12 RX ADMIN — FLUCONAZOLE 150 MG: 150 TABLET ORAL at 09:38

## 2025-08-12 RX ADMIN — INSULIN LISPRO 12 UNITS: 100 INJECTION, SOLUTION INTRAVENOUS; SUBCUTANEOUS at 09:38

## 2025-08-12 RX ADMIN — INSULIN LISPRO 18 UNITS: 100 INJECTION, SOLUTION INTRAVENOUS; SUBCUTANEOUS at 13:01

## 2025-08-12 RX ADMIN — INSULIN LISPRO 12 UNITS: 100 INJECTION, SOLUTION INTRAVENOUS; SUBCUTANEOUS at 13:00

## 2025-08-12 ASSESSMENT — COGNITIVE AND FUNCTIONAL STATUS - GENERAL: MOBILITY SCORE: 24

## 2025-08-12 ASSESSMENT — PAIN - FUNCTIONAL ASSESSMENT: PAIN_FUNCTIONAL_ASSESSMENT: 0-10

## 2025-08-12 ASSESSMENT — PAIN SCALES - GENERAL: PAINLEVEL_OUTOF10: 0 - NO PAIN

## 2025-08-12 NOTE — DISCHARGE INSTRUCTIONS
You were in the hospital because your blood sugar was very high and your diabetes was not under control. You also had repeated infections in the perirectal and labial area.    While in the hospital, you were given IV antibiotics. We have now switched you to an oral antibiotic called Bactrim. Take it for a total of 10 days.    We also started you on insulin:    Lantus: 40 units at bedtime    Mealtime insulin: 18 units with each meal    Follow your sliding scale insulin instructions with meals as follow   0 unit(s) if Blood glucose is between    4 unit(s) if Blood glucose is between 151-200  8 unit(s) if Blood glucose is between 201-250   12 unit(s) if Blood glucose is between 251-300  16 unit(s) if Blood glucose is between 301-350   20 unit(s) if Blood glucose is between 351-400     Follow-up appointments:    See your primary care doctor soon to review your hospital stay and help manage your diabetes.    Go to your endocrinology appointment for diabetes care.    Go to your neurology appointment for your nerve problems (neuropathy).    If you don’t have a primary care doctor yet, follow the referral to set one up.    Please take all of your medications as directed. You may also consider taking a vitamin B supplement that includes B6 and B2 (riboflavin) to address your oral complaints.     If you have any concerning symptoms, or worsening symptoms please call or return, such as chest pain, shortness of breath, new onset confusion, loss of sensation, or fever >103F.    Thank you for allowing us to participate in your health care.    -[facility] [place] [facility].

## 2025-08-12 NOTE — CARE PLAN
Problem: Discharge Planning  Goal: Discharge to home or other facility with appropriate resources  Outcome: Adequate for Discharge     Problem: Chronic Conditions and Co-morbidities  Goal: Patient's chronic conditions and co-morbidity symptoms are monitored and maintained or improved  Outcome: Adequate for Discharge     Problem: Nutrition  Goal: Nutrient intake appropriate for maintaining nutritional needs  Outcome: Adequate for Discharge     Problem: Diabetes  Goal: Achieve decreasing blood glucose levels by end of shift  Outcome: Adequate for Discharge  Goal: Increase stability of blood glucose readings by end of shift  Outcome: Adequate for Discharge  Goal: Decrease in ketones present in urine by end of shift  Outcome: Adequate for Discharge  Goal: Maintain electrolyte levels within acceptable range throughout shift  Outcome: Adequate for Discharge  Goal: Maintain glucose levels >70mg/dl to <250mg/dl throughout shift  Outcome: Adequate for Discharge  Goal: Learn about and adhere to nutrition recommendations by end of shift  Outcome: Adequate for Discharge  Goal: Vital signs within normal range for age by end of shift  Outcome: Adequate for Discharge  Goal: Increase self care and/or family involovement by end of shift  Outcome: Adequate for Discharge  Goal: Receive DSME education by end of shift  Outcome: Adequate for Discharge      The clinical goals for the shift include see care plan    Patient heplock removed prior to discharge.  Reviewed instructions with the patient.  She has been provided with the scheduling number to call for an appointment for PCP.

## 2025-08-12 NOTE — PROGRESS NOTES
"    Endocrinology Inpatient Consult Progress Note     PATIENT NAME: Nia Helms  MRN: [id number]  DATE: 8/12/2025    CONSULTING PHYSICIAN: Dr. KATIE Driver  REASON FOR CONSULT: Uncontrolled T2DM.  Right Adrenal Nodule.      Interval Events     No acute events overnight.  She did work extensively with diabetes education yesterday.    Patient had 2 packets of peanut butter as well as crackers yesterday at bedtime.  She endorses drinking a lot of Coke and regular pop during the day.  She states she does this when she smokes.      Physical Examination     /75 (BP Location: Right arm, Patient Position: Lying)   Pulse 77   Temp 37.2 °C (99 °F) (Temporal)   Resp 18   Ht 1.676 m (5' 6\")   Wt 68.5 kg (151 lb)   SpO2 99%   BMI 24.37 kg/m²   No acute distress.  Appropriate affect.  Regular rate and rhythm.  Nonlabored respiration.  No pedal edema bilaterally.      Medications     Reviewed MAR       Data     Recent Labs and Imaging Reviewed      Assessment / Plan         # Uncontrolled Type 2 Diabetes Mellitus  Home Regimen: None.   Hemoglobin A1c (8/25): 16.7%  Nutrition: CHO Controlled Diet.     I counseled her at length.  I explained to her the relationship between her drinking her carbohydrates and her A1c.  I explained to her that it was nonnegotiable for me for her to drink regular soda.  It is going to be impossible to manage her sugar if she continues to do so.  I also discussed with her the issues with her bedtime snacking.  I have asked her to try to snack on nuts or vegetables as these do not contain carbohydrates.  She seemed understand.  She would like to follow-up with me as an outpatient.    We have made significant headway on her sugars, but I do worry about what will happen when she goes home.     - Increase glargine to 40 units nightly  - Increase lispro to 18 units with each meal  - Continue sliding scale #3 at mealtime and #2 at HS    # Right Adrenal Nodule: Reportedly was 1.2 cm in August 2020 at " [facility].  This was 1.8 cm in December 2023 at [facility].  Finally this was found to be 2.3 cm on her most recent scan in April 2025 at [facility].  The radiologist called this a myelolipoma.  This was a enhanced study, there has been no remarks about the attenuation of this nodule.  The CT of the pelvis done during this admission does not capture either adrenal gland.  She clinically does not appear to have hypercortisolism, but that would explain her insulin resistance.  This needs outpatient follow-up with a 1 mg overnight dexamethasone suppression test.     # Labial Abscess, Perirectal Abscess: I explained to the patient however hyperglycemia relates to these 2 issues.    # Dyslipidemia: Given her uncontrolled T2DM as well as smoking, the patient would benefit from high intensity statin for primary prevention of ASCVD.    # Tobacco Abuse: Counseled extensively on cessation on 8/12.         Hemant Mayfield, DO  Endocrinology, Diabetes, and Metabolism    Available via [employer] Messenger    Please excuse any typographical or unwanted errors within this documentation as voice recognition software was used to dictate this note.

## 2025-08-12 NOTE — DISCHARGE SUMMARY
"Discharge Diagnosis  Diabetic ketoacidosis without coma associated with type 2 diabetes mellitus    Issues Requiring Follow-Up  Follow-up with primary care regarding hospital course.  Follow-up with endocrinology regarding hospital course  Follow-up with neurology regarding neuropathy.    Discharge Meds     Medication List      START taking these medications     alcohol swabs; Apply 1 Box topically if needed (for accue check).   BD Ultra-Fine Mi Pen Needle 32 gauge x 5/32\" needle; Generic drug: pen   needle, diabetic; Use 1 Pen needle 4 times a day for insulin injections   blood-glucose meter misc; Commonly known as: True Metrix Glucose Meter;   Use to check blood sugar   fluconazole 150 mg tablet; Commonly known as: Diflucan; Take 1 tablet   (150 mg) by mouth once daily for 3 doses.; Start taking on: August 13, 2025   insulin aspart 100 unit/mL (3 mL) pen; Commonly known as: NovoLOG;   Inject 18 Units under the skin 3 times a day before meals. Take as   directed per insulin instructions.   insulin glargine-yfgn 100 unit/mL (3 mL) pen; Inject 40 Units under the   skin once daily at bedtime. Take as directed per insulin instructions.   insulin lispro 100 unit/mL injection; Inject 0-20 Units under the skin 3   times a day before meals. Take as directed per insulin instructions. 0   unit(s) if Blood glucose is between   4 unit(s) if Blood glucose is   between 151-200  8 unit(s) if Blood glucose is between 201-250  12 unit(s)   if Blood glucose is between 251-300  16 unit(s) if Blood glucose is   between 301-350  20 unit(s) if Blood glucose is between 351-400   lancets misc; Test your blood glucose 4 times a day as directed   sulfamethoxazole-trimethoprim 800-160 mg tablet; Commonly known as:   Bactrim DS; Take 1 tablet by mouth every 12 hours for 17 doses.   * True Metrix Glucose Test Strip; Generic drug: blood sugar diagnostic;   Use 1 strip every 6 hours to test blood sugar if needed   * blood sugar " diagnostic; 1 strip 4 times a day before meals.  * This list has 2 medication(s) that are the same as other medications   prescribed for you. Read the directions carefully, and ask your doctor or   other care provider to review them with you.       Test Results Pending At Discharge  Pending Labs       Order Current Status    Vitamin B6 In process    Blood Culture Preliminary result    Blood Culture Preliminary result            Hospital Course  This is a 60 y.o. female with a hx of uncontrolled, non-compliant T2DM (A1c 16) and recurrent rectal/labial abscesses who presented on 8/8/2025 from home with fever, nausea, and concern for worsening abscesses and hyperglycemia. In the ED, she was vitally stable. Labs showed hyperglycemia without leukocytosis and no acidosis on pH. Imaging revealed early developing abscesses. She was started on Zosyn and IV fluids, and admitted to the inpatient service for further care.    During her hospital course, she was covered with Vanco and Zosyn. Wound cultures showed no growth, and blood cultures were negative. She was evaluated by General Surgery and Obstetrics & Gynecology, both of whom determined no invasive intervention was needed. They recommended a 10-day course of oral antibiotics with outpatient follow-up if required. Endocrinology was consulted for uncontrolled diabetes. Multiple adjustments were made to her insulin regimen, and she was discharged on Lantus 40 units nightly, mealtime lispro 18 units with meals, and sliding scale #3. A prescription for Bactrim was provided for a total 10-day course.    Follow-up appointments were arranged with endocrinology, neurology, and primary care. The patient improved clinically, was deemed medically stable for discharge, and was discharged in stable condition with instructions to follow-up with her PCP clinic for continued care.    Pertinent Physical Exam At Time of Discharge    General: Not in acute distress, alert  HEENT: ENDER  head intact and normocephalic  Neck: Normal to inspection  Lungs: Clear to auscultation, work of breathing within normal limit  Cardiac: Regular rate and rhythm  Abdomen: Soft nontender, positive bowel sounds  : Exam deferred  Skin: Intact  Hematology: No petechia or excessive ecchymosis  Musculoskeletal: Without significant trauma  Neurological: Alert awake oriented, no focal deficit, cranial nerves grossly intact  Psych: No suicidal ideation or homicidal ideation     Outpatient Follow-Up  No future appointments.    Assessment and plan discussed with my attending.   Rios Cates MD   Internal Medicine, PGY-3 .

## 2025-08-12 NOTE — PROGRESS NOTES
Nutrition Diet Education  Reason for education: Uncontrolled DM with A1C of 16.7%    Nutrition Note:  Nia Helms is a 60 y.o. female presenting to ED on 8/8 due to left labial and right perirectal abscess. Per H&P pt has admission for similar reason in April at [facility].  Endocrinology consulted due to uncontrolled DMII.  Pt states she has been diabetic for 20 years.    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    Nutrition Significant Labs:  BMP Trend:   Results from last 7 days   Lab Units 08/12/25  0622 08/11/25  0605 08/10/25  0658 08/09/25  0231   GLUCOSE mg/dL 256* 320* 296* 234*   CALCIUM mg/dL 8.8 8.7 9.0 8.3*   SODIUM mmol/L 138 136 135* 136   POTASSIUM mmol/L 3.8 3.6 3.8 3.7   CO2 mmol/L 25 27 25 21   CHLORIDE mmol/L 104 102 101 107   BUN mg/dL 13 13 11 10   CREATININE mg/dL 0.72 0.59 0.63 0.59    , A1C:  Lab Results   Component Value Date    HGBA1C 16.7 (H) 08/08/2025   A1C in April of 2025 was 16.4%    Current Diet: Adult diet Consistent Carb; CCD 60 gm/meal    Encounter summary: Met with pt at bedside.  She states that has quit smoking prior to admission and plans on focusing on not smoking or drinking regular pop when she gets home.  Pt is a  for [employer] and states that she has developed a bad habit through the years of consuming regular coke specifically when she is smoking.  She has stopped smoking in the past any when she has she does not feel the urge to consume regular pop.  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  Notes fear of gaining weight back (UBW stated as 215#) but knows that she will gain some weight pain as her blood sugar becomes better controlled.  She typically eats breakfast and dinner and snacks around lunch time. Pt notes that she has worn CGM in the past but prefers to just take her blood sugar.  She had never been educated on CHO counting before but has a good knowledge base of foods that are significant carbohydrate foods.    Education Documentation: Recommended 45-60grams carbohydrate per meal and 15-30grams carbohydrate per snack.  Discussed how stopping regular pop will help her meet this budget consistently and be a significant step towards better overall glucose control as well as preventing regaining an undesireable amount of weight.    Education Documentation  Nutrition Related Education, taught by Emiliano Campos RD at 8/12/2025  1:14 PM.  Learner: Patient  Readiness: Acceptance  Method: Explanation, Handout  Response: Verbalizes Understanding  Comment: Provided and discussed Planning Healthy Meals handout from [employer] as well as What Can I Drink? handout from [employer]          Nutrition Prescription/Recommended Diet:  Individualized Nutrition Prescription Provided for : Recommend continue 60g CCD restriction    Time Spent/Follow-up Reminder:   Time Spent (min): 60 minutes  Last Date of Nutrition Visit: 08/12/25  Nutrition Follow-Up Needed?: 5-7 days  Follow up Comment: TERI   [Initial Visit] : an initial visit for [FreeTextEntry2] : Left knee pain

## 2025-08-12 NOTE — CONSULTS
"Inpatient Diabetes Education Consult    Education materials: DKA handout from [facility].     Follow-up visit.   Reviewed importance of pt taking insulin and checking blood sugar as prescribed. She seems willing to take the insulin. Pt plans to follow with Dr. Mayfield as outpatient. Shared that since she requires so much insulin she does seem to have insulin resistance. Asked her to keep this in mind when at home and try to be patient if blood sugars aren't right where she wants them. That it will take time and work with Dr. Mayfield as outpatient to get her DM medication plan tailored to her needs. She mentioned again today \"I can't wait to go home and get on the scale. I'm sure I've gained 30 pounds.\" She'd lost 70 pounds as side effect from not taking insulin. She reiterated \"I didn't stop insulin to lose weight. I was tired of taking it and my blood sugars staying too high.\" She was rubbing her feet a lot during visit. Said her feet and hands feel really cold on the inside. At night her feet hurt. She does have neuropathy. Shared that by taking her insulin and working with the doctor to get her blood sugars in better control that will help prevent neuropathy from getting worse.   Asked her about history of gastroparesis. She said she had gastric empty study \"they said food emptied slower, but I don't think I have it.\" She also mentioned she has BM only 2-3 times per week. Feels like she's giving birth sometimes with BM's. She had colonoscopy and had to fast for 5 days because food was still in her system. Shared that all sounds like she has gastroparesis. This would also make blood sugar management more challenging. Discussed including fermented to foods to help with bowel regularity. She's not sure she likes those foods, but said she may look into it.   Pharmacy did not yet bring up her True Metrix meter. Pt said \"They are going to bring that up with my medications.\" Will follow as needed.     "

## 2025-08-12 NOTE — NURSING NOTE
ADOD: 8/12.   Destination: home  Transportation Provided by: SELF  Patient feels updated by provider(s) and involved in POC.   Patient has been advised to defer to AVS for new medications and follow-up visits.   Patient is inactive with MyChart.  Patient's Pharmacy [facility].  Appointments will be made by patient.  Patient has been notified about a survey and call back is to be expected 1-2 weeks post discharge.   Notified patient that DC Nurse is available everyday throughout their stay if any assistance is needed.     [facility] to deliver prior to DC  Patient will transport self home

## 2025-08-12 NOTE — PROGRESS NOTES
Spiritual Care Visit  Spiritual Care Request    Reason for Visit:  Routine Visit: Introduction     Request Received From:       Focus of Care:  Visited With: Patient         Refer to :          Spiritual Care Assessment    Spiritual Assessment:                      Care Provided:  Intended Effects: Build relationship of care and support, Preserve dignity and respect, Establish rapport and connectedness  Methods: Offer spiritual/Jewish support  Interventions: Active listening, Ask guided questions, Share words of hope and inspiration    Sense of Community and or Baptist Affiliation:  [place]ism         Addressed Needs/Concerns and/or [place] Through:     Sacramental Encounters  Sacrament of Sick-Anointing: Patient declined anointing    Outcome:        Advance Directives:         Spiritual Care Annotation    Annotation:

## 2025-08-13 LAB
ATRIAL RATE: 88 BPM
BACTERIA BLD AEROBE CULT: ABNORMAL
BACTERIA BLD CULT: ABNORMAL
GRAM STN SPEC: ABNORMAL
P AXIS: 42 DEGREES
P OFFSET: 196 MS
P ONSET: 137 MS
PR INTERVAL: 164 MS
Q ONSET: 219 MS
QRS COUNT: 14 BEATS
QRS DURATION: 86 MS
QT INTERVAL: 376 MS
QTC CALCULATION(BAZETT): 454 MS
QTC FREDERICIA: 427 MS
R AXIS: 2 DEGREES
T AXIS: 19 DEGREES
T OFFSET: 407 MS
VENTRICULAR RATE: 88 BPM

## 2025-08-15 LAB — PYRIDOXAL PHOS SERPL-SCNC: 14 NMOL/L (ref 20–125)
